# Patient Record
Sex: MALE | Race: WHITE | NOT HISPANIC OR LATINO | URBAN - METROPOLITAN AREA
[De-identification: names, ages, dates, MRNs, and addresses within clinical notes are randomized per-mention and may not be internally consistent; named-entity substitution may affect disease eponyms.]

---

## 2017-05-30 ENCOUNTER — OUTPATIENT (OUTPATIENT)
Dept: OUTPATIENT SERVICES | Facility: HOSPITAL | Age: 82
LOS: 1 days | Discharge: HOME | End: 2017-05-30

## 2017-06-28 DIAGNOSIS — N18.4 CHRONIC KIDNEY DISEASE, STAGE 4 (SEVERE): ICD-10-CM

## 2017-06-28 DIAGNOSIS — E21.3 HYPERPARATHYROIDISM, UNSPECIFIED: ICD-10-CM

## 2017-06-28 DIAGNOSIS — I10 ESSENTIAL (PRIMARY) HYPERTENSION: ICD-10-CM

## 2017-10-26 ENCOUNTER — INPATIENT (INPATIENT)
Facility: HOSPITAL | Age: 82
LOS: 13 days | Discharge: NURSING, REHAB OR LONG-TERM CARE | End: 2017-11-09
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
## 2017-11-02 PROBLEM — Z00.00 ENCOUNTER FOR PREVENTIVE HEALTH EXAMINATION: Status: ACTIVE | Noted: 2017-11-02

## 2017-11-13 DIAGNOSIS — G92 TOXIC ENCEPHALOPATHY: ICD-10-CM

## 2017-11-13 DIAGNOSIS — R26.89 OTHER ABNORMALITIES OF GAIT AND MOBILITY: ICD-10-CM

## 2017-11-13 DIAGNOSIS — Z04.3 ENCOUNTER FOR EXAMINATION AND OBSERVATION FOLLOWING OTHER ACCIDENT: ICD-10-CM

## 2017-11-13 DIAGNOSIS — J15.6 PNEUMONIA DUE TO OTHER GRAM-NEGATIVE BACTERIA: ICD-10-CM

## 2017-11-13 DIAGNOSIS — I89.0 LYMPHEDEMA, NOT ELSEWHERE CLASSIFIED: ICD-10-CM

## 2017-11-13 DIAGNOSIS — E87.0 HYPEROSMOLALITY AND HYPERNATREMIA: ICD-10-CM

## 2017-11-13 DIAGNOSIS — F03.90 UNSPECIFIED DEMENTIA WITHOUT BEHAVIORAL DISTURBANCE: ICD-10-CM

## 2017-11-13 DIAGNOSIS — R68.0 HYPOTHERMIA, NOT ASSOCIATED WITH LOW ENVIRONMENTAL TEMPERATURE: ICD-10-CM

## 2017-11-13 DIAGNOSIS — N17.0 ACUTE KIDNEY FAILURE WITH TUBULAR NECROSIS: ICD-10-CM

## 2017-11-13 DIAGNOSIS — I12.9 HYPERTENSIVE CHRONIC KIDNEY DISEASE WITH STAGE 1 THROUGH STAGE 4 CHRONIC KIDNEY DISEASE, OR UNSPECIFIED CHRONIC KIDNEY DISEASE: ICD-10-CM

## 2017-11-13 DIAGNOSIS — R13.10 DYSPHAGIA, UNSPECIFIED: ICD-10-CM

## 2017-11-13 DIAGNOSIS — R31.0 GROSS HEMATURIA: ICD-10-CM

## 2017-11-13 DIAGNOSIS — L89.810 PRESSURE ULCER OF HEAD, UNSTAGEABLE: ICD-10-CM

## 2017-11-13 DIAGNOSIS — E87.2 ACIDOSIS: ICD-10-CM

## 2017-11-13 DIAGNOSIS — N25.81 SECONDARY HYPERPARATHYROIDISM OF RENAL ORIGIN: ICD-10-CM

## 2017-11-13 DIAGNOSIS — N40.1 BENIGN PROSTATIC HYPERPLASIA WITH LOWER URINARY TRACT SYMPTOMS: ICD-10-CM

## 2017-11-13 DIAGNOSIS — I48.91 UNSPECIFIED ATRIAL FIBRILLATION: ICD-10-CM

## 2017-11-13 DIAGNOSIS — M62.82 RHABDOMYOLYSIS: ICD-10-CM

## 2017-11-13 DIAGNOSIS — Z51.5 ENCOUNTER FOR PALLIATIVE CARE: ICD-10-CM

## 2017-11-13 DIAGNOSIS — E43 UNSPECIFIED SEVERE PROTEIN-CALORIE MALNUTRITION: ICD-10-CM

## 2017-11-13 DIAGNOSIS — R04.0 EPISTAXIS: ICD-10-CM

## 2017-11-13 DIAGNOSIS — E86.9 VOLUME DEPLETION, UNSPECIFIED: ICD-10-CM

## 2017-11-13 DIAGNOSIS — Z79.82 LONG TERM (CURRENT) USE OF ASPIRIN: ICD-10-CM

## 2017-11-13 DIAGNOSIS — J69.0 PNEUMONITIS DUE TO INHALATION OF FOOD AND VOMIT: ICD-10-CM

## 2017-11-13 DIAGNOSIS — R19.5 OTHER FECAL ABNORMALITIES: ICD-10-CM

## 2017-11-13 DIAGNOSIS — N18.4 CHRONIC KIDNEY DISEASE, STAGE 4 (SEVERE): ICD-10-CM

## 2017-11-13 DIAGNOSIS — I87.2 VENOUS INSUFFICIENCY (CHRONIC) (PERIPHERAL): ICD-10-CM

## 2017-11-13 DIAGNOSIS — L89.890 PRESSURE ULCER OF OTHER SITE, UNSTAGEABLE: ICD-10-CM

## 2017-11-13 DIAGNOSIS — R33.8 OTHER RETENTION OF URINE: ICD-10-CM

## 2017-11-21 ENCOUNTER — OUTPATIENT (OUTPATIENT)
Dept: OUTPATIENT SERVICES | Facility: HOSPITAL | Age: 82
LOS: 1 days | Discharge: HOME | End: 2017-11-21

## 2017-11-21 DIAGNOSIS — G93.40 ENCEPHALOPATHY, UNSPECIFIED: ICD-10-CM

## 2017-11-21 DIAGNOSIS — N17.8 OTHER ACUTE KIDNEY FAILURE: ICD-10-CM
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
## 2017-12-20 ENCOUNTER — OUTPATIENT (OUTPATIENT)
Dept: OUTPATIENT SERVICES | Facility: HOSPITAL | Age: 82
LOS: 1 days | Discharge: HOME | End: 2017-12-20
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
## 2017-12-22 DIAGNOSIS — N39.0 URINARY TRACT INFECTION, SITE NOT SPECIFIED: ICD-10-CM

## 2018-02-17 ENCOUNTER — OUTPATIENT (OUTPATIENT)
Dept: OUTPATIENT SERVICES | Facility: HOSPITAL | Age: 83
LOS: 1 days | Discharge: HOME | End: 2018-02-17

## 2018-02-17 DIAGNOSIS — E78.5 HYPERLIPIDEMIA, UNSPECIFIED: ICD-10-CM

## 2018-02-17 DIAGNOSIS — N17.8 OTHER ACUTE KIDNEY FAILURE: ICD-10-CM

## 2018-04-23 ENCOUNTER — OUTPATIENT (OUTPATIENT)
Dept: OUTPATIENT SERVICES | Facility: HOSPITAL | Age: 83
LOS: 1 days | Discharge: HOME | End: 2018-04-23

## 2018-04-23 DIAGNOSIS — R50.9 FEVER, UNSPECIFIED: ICD-10-CM

## 2018-04-24 ENCOUNTER — OUTPATIENT (OUTPATIENT)
Dept: OUTPATIENT SERVICES | Facility: HOSPITAL | Age: 83
LOS: 1 days | Discharge: HOME | End: 2018-04-24

## 2018-04-24 DIAGNOSIS — Z02.9 ENCOUNTER FOR ADMINISTRATIVE EXAMINATIONS, UNSPECIFIED: ICD-10-CM

## 2018-04-24 DIAGNOSIS — I10 ESSENTIAL (PRIMARY) HYPERTENSION: ICD-10-CM

## 2018-04-24 DIAGNOSIS — R78.81 BACTEREMIA: ICD-10-CM

## 2018-04-25 ENCOUNTER — OUTPATIENT (OUTPATIENT)
Dept: OUTPATIENT SERVICES | Facility: HOSPITAL | Age: 83
LOS: 1 days | Discharge: HOME | End: 2018-04-25

## 2018-04-25 DIAGNOSIS — D72.829 ELEVATED WHITE BLOOD CELL COUNT, UNSPECIFIED: ICD-10-CM

## 2018-04-26 ENCOUNTER — OUTPATIENT (OUTPATIENT)
Dept: OUTPATIENT SERVICES | Facility: HOSPITAL | Age: 83
LOS: 1 days | Discharge: HOME | End: 2018-04-26

## 2018-04-26 DIAGNOSIS — N17.8 OTHER ACUTE KIDNEY FAILURE: ICD-10-CM

## 2018-04-26 DIAGNOSIS — R79.89 OTHER SPECIFIED ABNORMAL FINDINGS OF BLOOD CHEMISTRY: ICD-10-CM

## 2018-05-07 ENCOUNTER — OUTPATIENT (OUTPATIENT)
Dept: OUTPATIENT SERVICES | Facility: HOSPITAL | Age: 83
LOS: 1 days | Discharge: HOME | End: 2018-05-07

## 2018-05-07 DIAGNOSIS — E87.6 HYPOKALEMIA: ICD-10-CM

## 2018-05-10 ENCOUNTER — OUTPATIENT (OUTPATIENT)
Dept: OUTPATIENT SERVICES | Facility: HOSPITAL | Age: 83
LOS: 1 days | Discharge: HOME | End: 2018-05-10

## 2018-05-10 DIAGNOSIS — E55.9 VITAMIN D DEFICIENCY, UNSPECIFIED: ICD-10-CM

## 2018-05-19 ENCOUNTER — OUTPATIENT (OUTPATIENT)
Dept: OUTPATIENT SERVICES | Facility: HOSPITAL | Age: 83
LOS: 1 days | Discharge: HOME | End: 2018-05-19

## 2018-05-19 DIAGNOSIS — Z02.9 ENCOUNTER FOR ADMINISTRATIVE EXAMINATIONS, UNSPECIFIED: ICD-10-CM

## 2018-05-21 ENCOUNTER — OUTPATIENT (OUTPATIENT)
Dept: OUTPATIENT SERVICES | Facility: HOSPITAL | Age: 83
LOS: 1 days | Discharge: HOME | End: 2018-05-21

## 2018-05-21 DIAGNOSIS — R33.9 RETENTION OF URINE, UNSPECIFIED: ICD-10-CM

## 2018-05-29 ENCOUNTER — OUTPATIENT (OUTPATIENT)
Dept: OUTPATIENT SERVICES | Facility: HOSPITAL | Age: 83
LOS: 1 days | Discharge: HOME | End: 2018-05-29

## 2018-05-29 DIAGNOSIS — E87.6 HYPOKALEMIA: ICD-10-CM

## 2018-06-25 ENCOUNTER — OUTPATIENT (OUTPATIENT)
Dept: OUTPATIENT SERVICES | Facility: HOSPITAL | Age: 83
LOS: 1 days | Discharge: HOME | End: 2018-06-25

## 2018-06-25 DIAGNOSIS — N39.0 URINARY TRACT INFECTION, SITE NOT SPECIFIED: ICD-10-CM

## 2018-06-26 ENCOUNTER — OUTPATIENT (OUTPATIENT)
Dept: OUTPATIENT SERVICES | Facility: HOSPITAL | Age: 83
LOS: 1 days | Discharge: HOME | End: 2018-06-26

## 2018-06-26 DIAGNOSIS — A41.9 SEPSIS, UNSPECIFIED ORGANISM: ICD-10-CM

## 2018-06-27 DIAGNOSIS — R79.9 ABNORMAL FINDING OF BLOOD CHEMISTRY, UNSPECIFIED: ICD-10-CM

## 2018-07-12 ENCOUNTER — INPATIENT (INPATIENT)
Facility: HOSPITAL | Age: 83
LOS: 3 days | Discharge: NURSING, REHAB OR LONG-TERM CARE | End: 2018-07-16
Attending: HOSPITALIST | Admitting: HOSPITALIST

## 2018-07-12 ENCOUNTER — OUTPATIENT (OUTPATIENT)
Dept: OUTPATIENT SERVICES | Facility: HOSPITAL | Age: 83
LOS: 1 days | Discharge: HOME | End: 2018-07-12

## 2018-07-12 VITALS
HEART RATE: 114 BPM | OXYGEN SATURATION: 93 % | DIASTOLIC BLOOD PRESSURE: 46 MMHG | SYSTOLIC BLOOD PRESSURE: 85 MMHG | RESPIRATION RATE: 22 BRPM

## 2018-07-12 DIAGNOSIS — A41.9 SEPSIS, UNSPECIFIED ORGANISM: ICD-10-CM

## 2018-07-12 DIAGNOSIS — T83.511A INFECTION AND INFLAMMATORY REACTION DUE TO INDWELLING URETHRAL CATHETER, INITIAL ENCOUNTER: ICD-10-CM

## 2018-07-12 LAB
ALBUMIN SERPL ELPH-MCNC: 2.3 G/DL — LOW (ref 3.5–5.2)
ALP SERPL-CCNC: 257 U/L — HIGH (ref 30–115)
ALT FLD-CCNC: 16 U/L — SIGNIFICANT CHANGE UP (ref 0–41)
ANION GAP SERPL CALC-SCNC: 20 MMOL/L — HIGH (ref 7–14)
ANISOCYTOSIS BLD QL: SLIGHT — SIGNIFICANT CHANGE UP
APTT BLD: 29.6 SEC — SIGNIFICANT CHANGE UP (ref 27–39.2)
AST SERPL-CCNC: 25 U/L — SIGNIFICANT CHANGE UP (ref 0–41)
BASE EXCESS BLDV CALC-SCNC: -0.1 MMOL/L — SIGNIFICANT CHANGE UP (ref -2–2)
BASOPHILS # BLD AUTO: 0 K/UL — SIGNIFICANT CHANGE UP (ref 0–0.2)
BASOPHILS NFR BLD AUTO: 0 % — SIGNIFICANT CHANGE UP (ref 0–1)
BILIRUB SERPL-MCNC: 0.6 MG/DL — SIGNIFICANT CHANGE UP (ref 0.2–1.2)
BLD GP AB SCN SERPL QL: SIGNIFICANT CHANGE UP
BUN SERPL-MCNC: 112 MG/DL — CRITICAL HIGH (ref 10–20)
BURR CELLS BLD QL SMEAR: PRESENT — SIGNIFICANT CHANGE UP
BURR CELLS BLD QL SMEAR: SLIGHT — SIGNIFICANT CHANGE UP
CA-I SERPL-SCNC: 1.22 MMOL/L — SIGNIFICANT CHANGE UP (ref 1.12–1.3)
CALCIUM SERPL-MCNC: 9.4 MG/DL — SIGNIFICANT CHANGE UP (ref 8.5–10.1)
CHLORIDE SERPL-SCNC: 94 MMOL/L — LOW (ref 98–110)
CK MB CFR SERPL CALC: 1.2 NG/ML — SIGNIFICANT CHANGE UP (ref 0.6–6.3)
CK SERPL-CCNC: 136 U/L — SIGNIFICANT CHANGE UP (ref 0–225)
CO2 SERPL-SCNC: 22 MMOL/L — SIGNIFICANT CHANGE UP (ref 17–32)
CREAT SERPL-MCNC: 5.1 MG/DL — CRITICAL HIGH (ref 0.7–1.5)
EOSINOPHIL # BLD AUTO: 0 K/UL — SIGNIFICANT CHANGE UP (ref 0–0.7)
EOSINOPHIL NFR BLD AUTO: 0 % — SIGNIFICANT CHANGE UP (ref 0–8)
GAS PNL BLDV: 135 MMOL/L — LOW (ref 136–145)
GAS PNL BLDV: SIGNIFICANT CHANGE UP
GIANT PLATELETS BLD QL SMEAR: PRESENT — SIGNIFICANT CHANGE UP
GLUCOSE SERPL-MCNC: 90 MG/DL — SIGNIFICANT CHANGE UP (ref 70–99)
HCO3 BLDV-SCNC: 25 MMOL/L — SIGNIFICANT CHANGE UP (ref 22–29)
HCT VFR BLD CALC: 31.3 % — LOW (ref 42–52)
HCT VFR BLDA CALC: 43.2 % — SIGNIFICANT CHANGE UP (ref 34–44)
HGB BLD CALC-MCNC: 14.1 G/DL — SIGNIFICANT CHANGE UP (ref 14–18)
HGB BLD-MCNC: 10.3 G/DL — LOW (ref 14–18)
INR BLD: 1.14 RATIO — SIGNIFICANT CHANGE UP (ref 0.65–1.3)
LACTATE BLDV-MCNC: 4 MMOL/L — HIGH (ref 0.5–1.6)
LYMPHOCYTES # BLD AUTO: 0.05 K/UL — LOW (ref 1.2–3.4)
LYMPHOCYTES # BLD AUTO: 1 % — LOW (ref 20.5–51.1)
MAGNESIUM SERPL-MCNC: 1.8 MG/DL — SIGNIFICANT CHANGE UP (ref 1.8–2.4)
MANUAL SMEAR VERIFICATION: SIGNIFICANT CHANGE UP
MCHC RBC-ENTMCNC: 29.9 PG — SIGNIFICANT CHANGE UP (ref 27–31)
MCHC RBC-ENTMCNC: 32.9 G/DL — SIGNIFICANT CHANGE UP (ref 32–37)
MCV RBC AUTO: 91 FL — SIGNIFICANT CHANGE UP (ref 80–94)
MONOCYTES # BLD AUTO: 0 K/UL — LOW (ref 0.1–0.6)
MONOCYTES NFR BLD AUTO: 0 % — LOW (ref 1.7–9.3)
NEUTROPHILS # BLD AUTO: 4.91 K/UL — SIGNIFICANT CHANGE UP (ref 1.4–6.5)
NEUTROPHILS NFR BLD AUTO: 99 % — HIGH (ref 42.2–75.2)
NRBC # BLD: 0 /100 WBCS — SIGNIFICANT CHANGE UP (ref 0–0)
PCO2 BLDV: 43 MMHG — SIGNIFICANT CHANGE UP (ref 41–51)
PH BLDV: 7.38 — SIGNIFICANT CHANGE UP (ref 7.26–7.43)
PLAT MORPH BLD: NORMAL — SIGNIFICANT CHANGE UP
PLATELET # BLD AUTO: 137 K/UL — SIGNIFICANT CHANGE UP (ref 130–400)
PO2 BLDV: 35 MMHG — SIGNIFICANT CHANGE UP (ref 20–40)
POIKILOCYTOSIS BLD QL AUTO: SIGNIFICANT CHANGE UP
POTASSIUM BLDV-SCNC: 4 MMOL/L — SIGNIFICANT CHANGE UP (ref 3.3–5.6)
POTASSIUM SERPL-MCNC: 4.1 MMOL/L — SIGNIFICANT CHANGE UP (ref 3.5–5)
POTASSIUM SERPL-SCNC: 4.1 MMOL/L — SIGNIFICANT CHANGE UP (ref 3.5–5)
PROT SERPL-MCNC: 5.1 G/DL — LOW (ref 6–8)
PROTHROM AB SERPL-ACNC: 12.3 SEC — SIGNIFICANT CHANGE UP (ref 9.95–12.87)
RBC # BLD: 3.44 M/UL — LOW (ref 4.7–6.1)
RBC # FLD: 15.8 % — HIGH (ref 11.5–14.5)
RBC BLD AUTO: (no result)
SAO2 % BLDV: 60 % — SIGNIFICANT CHANGE UP
SMUDGE CELLS # BLD: PRESENT — SIGNIFICANT CHANGE UP
SODIUM SERPL-SCNC: 136 MMOL/L — SIGNIFICANT CHANGE UP (ref 135–146)
TROPONIN T SERPL-MCNC: 0.14 NG/ML — CRITICAL HIGH
TYPE + AB SCN PNL BLD: SIGNIFICANT CHANGE UP
WBC # BLD: 4.96 K/UL — SIGNIFICANT CHANGE UP (ref 4.8–10.8)
WBC # FLD AUTO: 4.96 K/UL — SIGNIFICANT CHANGE UP (ref 4.8–10.8)

## 2018-07-12 RX ORDER — ASPIRIN/CALCIUM CARB/MAGNESIUM 324 MG
81 TABLET ORAL DAILY
Qty: 0 | Refills: 0 | Status: DISCONTINUED | OUTPATIENT
Start: 2018-07-12 | End: 2018-07-13

## 2018-07-12 RX ORDER — METOPROLOL TARTRATE 50 MG
50 TABLET ORAL
Qty: 0 | Refills: 0 | Status: DISCONTINUED | OUTPATIENT
Start: 2018-07-12 | End: 2018-07-13

## 2018-07-12 RX ORDER — SODIUM CHLORIDE 9 MG/ML
500 INJECTION INTRAMUSCULAR; INTRAVENOUS; SUBCUTANEOUS ONCE
Qty: 0 | Refills: 0 | Status: COMPLETED | OUTPATIENT
Start: 2018-07-12 | End: 2018-07-12

## 2018-07-12 RX ORDER — ACETAMINOPHEN 500 MG
650 TABLET ORAL EVERY 6 HOURS
Qty: 0 | Refills: 0 | Status: DISCONTINUED | OUTPATIENT
Start: 2018-07-12 | End: 2018-07-13

## 2018-07-12 RX ORDER — DOCUSATE SODIUM 100 MG
0 CAPSULE ORAL
Qty: 0 | Refills: 0 | COMMUNITY

## 2018-07-12 RX ORDER — ATORVASTATIN CALCIUM 80 MG/1
40 TABLET, FILM COATED ORAL AT BEDTIME
Qty: 0 | Refills: 0 | Status: DISCONTINUED | OUTPATIENT
Start: 2018-07-12 | End: 2018-07-13

## 2018-07-12 RX ORDER — MEROPENEM 1 G/30ML
500 INJECTION INTRAVENOUS ONCE
Qty: 0 | Refills: 0 | Status: COMPLETED | OUTPATIENT
Start: 2018-07-12 | End: 2018-07-13

## 2018-07-12 RX ORDER — HEPARIN SODIUM 5000 [USP'U]/ML
5000 INJECTION INTRAVENOUS; SUBCUTANEOUS EVERY 8 HOURS
Qty: 0 | Refills: 0 | Status: DISCONTINUED | OUTPATIENT
Start: 2018-07-12 | End: 2018-07-13

## 2018-07-12 RX ORDER — SENNA PLUS 8.6 MG/1
1 TABLET ORAL
Qty: 0 | Refills: 0 | COMMUNITY

## 2018-07-12 RX ORDER — FOLIC ACID 0.8 MG
1 TABLET ORAL DAILY
Qty: 0 | Refills: 0 | Status: DISCONTINUED | OUTPATIENT
Start: 2018-07-12 | End: 2018-07-13

## 2018-07-12 RX ORDER — FOLIC ACID 0.8 MG
1 TABLET ORAL
Qty: 0 | Refills: 0 | COMMUNITY

## 2018-07-12 RX ORDER — SENNA PLUS 8.6 MG/1
2 TABLET ORAL AT BEDTIME
Qty: 0 | Refills: 0 | Status: DISCONTINUED | OUTPATIENT
Start: 2018-07-12 | End: 2018-07-13

## 2018-07-12 RX ORDER — MEROPENEM 1 G/30ML
INJECTION INTRAVENOUS
Qty: 0 | Refills: 0 | Status: DISCONTINUED | OUTPATIENT
Start: 2018-07-13 | End: 2018-07-13

## 2018-07-12 RX ORDER — VANCOMYCIN HCL 1 G
1000 VIAL (EA) INTRAVENOUS ONCE
Qty: 0 | Refills: 0 | Status: COMPLETED | OUTPATIENT
Start: 2018-07-12 | End: 2018-07-12

## 2018-07-12 RX ORDER — SODIUM CHLORIDE 9 MG/ML
1000 INJECTION INTRAMUSCULAR; INTRAVENOUS; SUBCUTANEOUS
Qty: 0 | Refills: 0 | Status: DISCONTINUED | OUTPATIENT
Start: 2018-07-12 | End: 2018-07-13

## 2018-07-12 RX ORDER — SODIUM CHLORIDE 9 MG/ML
1000 INJECTION, SOLUTION INTRAVENOUS ONCE
Qty: 0 | Refills: 0 | Status: COMPLETED | OUTPATIENT
Start: 2018-07-12 | End: 2018-07-12

## 2018-07-12 RX ORDER — SODIUM CHLORIDE 9 MG/ML
1 INJECTION INTRAMUSCULAR; INTRAVENOUS; SUBCUTANEOUS ONCE
Qty: 0 | Refills: 0 | Status: COMPLETED | OUTPATIENT
Start: 2018-07-12 | End: 2018-07-12

## 2018-07-12 RX ORDER — MEROPENEM 1 G/30ML
500 INJECTION INTRAVENOUS EVERY 12 HOURS
Qty: 0 | Refills: 0 | Status: DISCONTINUED | OUTPATIENT
Start: 2018-07-13 | End: 2018-07-13

## 2018-07-12 RX ORDER — ATORVASTATIN CALCIUM 80 MG/1
1 TABLET, FILM COATED ORAL
Qty: 0 | Refills: 0 | COMMUNITY

## 2018-07-12 RX ORDER — CALCITRIOL 0.5 UG/1
0.5 CAPSULE ORAL DAILY
Qty: 0 | Refills: 0 | Status: DISCONTINUED | OUTPATIENT
Start: 2018-07-12 | End: 2018-07-13

## 2018-07-12 RX ORDER — VORTIOXETINE 5 MG/1
1 TABLET, FILM COATED ORAL
Qty: 0 | Refills: 0 | COMMUNITY

## 2018-07-12 RX ORDER — PANTOPRAZOLE SODIUM 20 MG/1
40 TABLET, DELAYED RELEASE ORAL
Qty: 0 | Refills: 0 | Status: DISCONTINUED | OUTPATIENT
Start: 2018-07-12 | End: 2018-07-13

## 2018-07-12 RX ORDER — LACTOBACILLUS ACIDOPHILUS 100MM CELL
2 CAPSULE ORAL
Qty: 0 | Refills: 0 | COMMUNITY

## 2018-07-12 RX ORDER — CEFEPIME 1 G/1
1000 INJECTION, POWDER, FOR SOLUTION INTRAMUSCULAR; INTRAVENOUS EVERY 12 HOURS
Qty: 0 | Refills: 0 | Status: DISCONTINUED | OUTPATIENT
Start: 2018-07-12 | End: 2018-07-12

## 2018-07-12 RX ORDER — CALCITRIOL 0.5 UG/1
1 CAPSULE ORAL
Qty: 0 | Refills: 0 | COMMUNITY

## 2018-07-12 RX ORDER — DOCUSATE SODIUM 100 MG
100 CAPSULE ORAL
Qty: 0 | Refills: 0 | Status: DISCONTINUED | OUTPATIENT
Start: 2018-07-12 | End: 2018-07-13

## 2018-07-12 RX ORDER — ASPIRIN/CALCIUM CARB/MAGNESIUM 324 MG
1 TABLET ORAL
Qty: 0 | Refills: 0 | COMMUNITY

## 2018-07-12 RX ADMIN — Medication 250 MILLIGRAM(S): at 17:37

## 2018-07-12 RX ADMIN — SODIUM CHLORIDE 2000 MILLILITER(S): 9 INJECTION, SOLUTION INTRAVENOUS at 18:22

## 2018-07-12 RX ADMIN — CEFEPIME 100 MILLIGRAM(S): 1 INJECTION, POWDER, FOR SOLUTION INTRAMUSCULAR; INTRAVENOUS at 17:16

## 2018-07-12 RX ADMIN — SODIUM CHLORIDE 75 MILLILITER(S): 9 INJECTION INTRAMUSCULAR; INTRAVENOUS; SUBCUTANEOUS at 22:25

## 2018-07-12 RX ADMIN — SODIUM CHLORIDE 1000 MILLILITER(S): 9 INJECTION INTRAMUSCULAR; INTRAVENOUS; SUBCUTANEOUS at 22:25

## 2018-07-12 NOTE — H&P ADULT - ASSESSMENT
89 yo M with PMHx of CAD s/p stent, GERD, HTN, DLD, Dementia presents from [facility] with hypotension and fever.    #) Septic shock, unclear etiology  - CXR is negative  - f/u UA, UCx  - f/u BCx   - Fever: Tylenol 650 mg Q6H PRN    #) FARZANA on CKD4  - Baseline Cr from 05/2018 was 1.7-1.8  - NS @ 75 cc/hr    #) Troponinemia likely 2/2 CKD as CKMB/CK is not appropriately elevated  - f/u CE @ 11:30 pm   - f/u stat EKG    #) Azotemia possibly 2/2 increased Cellular turnover [CLL?]  - Marked poikilocytosis, scott cells, smudge cells, giant platelet  - [facility] does not want aggressive measures; however, therefore, will not order Heme Onc consult    #) HTN  - c/w Lopressor 50 mg BID    #) DLD  - Lipitor 40 mg QHS    #) Normocytic Anemia  - Hb at baseline (~ 10)  - Marked poikilocytosis, scott cells, smudge cells, giant platelet    #) Vit D Deficiency  - Calcitriol 0.5 ug QD    #) Constipation  - Senna/Colace    #) Activity: Bedrest  #) Diet: Carb consistent, low sodium    #) GI ppx: PO Protonix QD  #) DVT ppx: HSQ    #) DNR/DNI as per WISAM 87 yo M with PMHx of CAD s/p stent, GERD, HTN, DLD, Dementia presents from [facility] with hypotension and fever.    #) Septic shock, unclear etiology, possibly UTI due to Urinary Catheter  - CXR is negative  - f/u UA, UCx  - f/u BCx   - Fever: Tylenol 650 mg Q6H PRN  - Change Childress today on 07/12  - d/c Cefepime, start on Meropenem 500 BID    #) FARZANA on CKD4  - Baseline Cr from 05/2018 was 1.7-1.8  - NS @ 75 cc/hr    #) Troponinemia likely 2/2 CKD as CKMB/CK is not appropriately elevated  - f/u CE @ 11:30 pm   - f/u stat EKG    #) Azotemia possibly 2/2 increased Cellular turnover [CLL?]  - Marked poikilocytosis, scott cells, smudge cells, giant platelet  - HCP does not want aggressive measures; however, therefore, will not order Heme Onc consult    #) HTN  - c/w Lopressor 50 mg BID    #) DLD  - Lipitor 40 mg QHS    #) Normocytic Anemia  - Hb at baseline (~ 10)  - Marked poikilocytosis, scott cells, smudge cells, giant platelet    #) Vit D Deficiency  - Calcitriol 0.5 ug QD    #) Constipation  - Senna/Colace    #) Activity: Bedrest  #) Diet: Carb consistent, low sodium    #) GI ppx: PO Protonix QD  #) DVT ppx: HSQ    #) DNR/DNI as per WISAM 87 yo M with PMHx of CAD s/p stent, GERD, HTN, DLD, Dementia presents from [facility] with hypotension and fever.    #) Septic shock, unclear etiology, possibly UTI due to Urinary Catheter  - CXR is negative  - f/u UA, UCx  - f/u BCx   - Fever: Tylenol 650 mg Q6H PRN  - Change Childress today on 07/12  - d/c Cefepime, start on Meropenem 500 BID    #) FARZANA on CKD4  - Baseline Cr from 05/2018 was 1.7-1.8  - NS @ 75 cc/hr    #) Troponinemia likely 2/2 CKD as CKMB/CK is not appropriately elevated  - f/u CE @ 11:30 pm   - Stat EKG shows new-onset AFib, rate-controlled    #) Azotemia possibly 2/2 increased Cellular turnover [CLL?]  - Marked poikilocytosis, scott cells, smudge cells, giant platelet  - HCP does not want aggressive measures therefore, will not order Heme Onc consult    #) HTN  - c/w Lopressor 50 mg BID    #) DLD  - Lipitor 40 mg QHS    #) Normocytic Anemia  - Hb at baseline (~ 10)  - Marked poikilocytosis, scott cells, smudge cells, giant platelet    #) Vit D Deficiency  - Calcitriol 0.5 ug QD    #) Constipation  - Senna/Colace    #) Activity: Bedrest  #) Diet: NPO    #) GI ppx: PO Protonix QD  #) DVT ppx: HSQ    #) DNR/DNI as per WISAM 89 yo M with PMHx of CAD s/p stent, GERD, HTN, DLD, Dementia [facility]    #) Septic shock, unclear etiology, possibly UTI due to Urinary Catheter  - CXR is negative  - f/u UA, UCx  - f/u BCx   - Fever: Tylenol 650 mg Q6H PRN  - Change Childress today on 07/12  - d/c Cefepime, start on Meropenem 500 BID    #) FARZANA on CKD4  - Baseline Cr from 05/2018 was 1.7-1.8  - NS @ 75 cc/hr  - No need for Retroperitoneal US as cousin does not want aggressive measures    #) Troponinemia likely 2/2 CKD as CKMB/CK is not appropriately elevated  - f/u CE @ 11:30 pm   - Stat EKG shows new-onset AFib, rate-controlled    #) Azotemia possibly 2/2 increased Cellular turnover [CLL?]  - Marked poikilocytosis, scott cells, smudge cells, giant platelet  - HCP does not want aggressive measures therefore, will not order Heme Onc consult    #) HTN  - c/w Lopressor 50 mg BID    #) DLD  - Lipitor 40 mg QHS    #) Normocytic Anemia  - Hb at baseline (~ 10)  - Marked poikilocytosis, scott cells, smudge cells, giant platelet    #) Vit D Deficiency  - Calcitriol 0.5 ug QD    #) Constipation  - Senna/Colace    #) Activity: Bedrest  #) Diet: NPO    #) GI ppx: PO Protonix QD  #) DVT ppx: HSQ    #) DNR/DNI as per WISAM 89 yo M with PMHx of CAD s/p stent, GERD, HTN, DLD, Dementia presents from [facility] with hypotension and fever.    #) Septic shock, unclear etiology, possibly UTI due to Urinary Catheter  - CXR is negative  - f/u UA, UCx  - f/u BCx   - Fever: Tylenol 650 mg Q6H PRN  - Change Childress today on 07/12  - d/c Cefepime, start on Meropenem 500 BID    #) FARZANA on CKD4  - Baseline Cr from 05/2018 was 1.7-1.8  - NS @ 75 cc/hr  - No need for Retroperitoneal US as cousin does not want aggressive measures    #) Troponinemia likely 2/2 CKD as CKMB/CK is not appropriately elevated  - f/u CE @ 11:30 pm   - Stat EKG shows new-onset AFib, rate-controlled. CHADVASc 3; however benefit of anticoagulation < risk given mortality. HCP also did not want anticoagulation.    #) Azotemia possibly 2/2 increased Cellular turnover [CLL?]  - Marked poikilocytosis, scott cells, smudge cells, giant platelet  - HCP does not want aggressive measures therefore, will not order Heme Onc consult    #) HTN  - c/w Lopressor 50 mg BID    #) DLD  - Lipitor 40 mg QHS    #) Normocytic Anemia  - Hb at baseline (~ 10)  - Marked poikilocytosis, scott cells, smudge cells, giant platelet    #) Vit D Deficiency  - Calcitriol 0.5 ug QD    #) Constipation  - Senna/Colace    #) Activity: Bedrest  #) Diet: NPO    #) GI ppx: PO Protonix QD  #) DVT ppx: HSQ    #) DNR/DNI as per WISAM

## 2018-07-12 NOTE — ED PROVIDER NOTE - PROGRESS NOTE DETAILS
Dr. Cummings discussed case with patient's power of  Norberto Tobias - states patient is comfort care only. No intubation, central line, pressors, advanced imaging. He is ok with blood work, IVF, abx and pain control. Dr. Cummings discussed case with patient's power of  Norberto Tobias - states patient is comfort care only. No intubation, central line, pressors, advanced imaging, chest compressions, or any other "extraordinary measures". He is ok with blood work, IVF, abx and pain control.

## 2018-07-12 NOTE — H&P ADULT - NSHPPHYSICALEXAM_GEN_ALL_CORE
PHYSICAL EXAM:  GEN: No acute distress  HEENT: NCAT  LUNGS: Clear to auscultation bilaterally   HEART: S1/S2 present. RRR.   ABD: Soft, non-tender, non-distended. Bowel sounds present  EXT:  NEURO: AAOX3 PHYSICAL EXAM:  GEN: Pale, anemic, weak appearing elderly male lying on bed  HEENT: NCAT  LUNGS: Clear to auscultation bilaterally   HEART: S1/S2 present. Tachycardic  ABD: Soft, tender to suprapubic area, non-distended. Bowel sounds present  EXT: No pitting edema  NEURO: Alert, and able to respond to questions with nod/yes/no. However, not able to make full/complex conversation.

## 2018-07-12 NOTE — ED PROVIDER NOTE - ATTENDING CONTRIBUTION TO CARE
87 y/o male with PMH CAD with stents, GERD, HTN, HLD, dementia who was sent to ED from [place] for hypotension and fever. 87 y/o male with PMH CAD with stents, GERD, HTN, HLD, dementia who was sent to ED from [place] for hypotension and fever. Patient is poor historian, unable to provide further history.    Vital Signs: I have reviewed the initial vital signs.  Constitutional: NAD, well-nourished, appears stated age, mild respiratory distress.  HEENT: Airway patent, moist MM, no erythema/swelling/deformity of oral structures. EOMI, PERRLA.  CV: regular rate, regular rhythm, well-perfused extremities, 2+ b/l DP and radial pulses equal.  Lungs: BCTA, no increased WOB.  ABD: NTND, no guarding or rebound, no pulsatile mass, no hernias.   MSK: Neck supple, nontender, nl ROM, no stepoff. Chest nontender. Back nontender in TLS spine or to b/l bony structures or flanks. Ext nontender, nl rom, no deformity.   INTEG: Skin warm, dry, no rash.  NEURO: A&Ox3, CN II-XII intact, normal strength 5/5 all 4 ext, nl sensation throughout, normal speech and coordination.  PSYCH: Calm, cooperative, normal affect and interaction.    Patient DNR/DNI based on POLST form in chart. WIll contact POA to determine what we are allowed to do for him medically. In the meantime, will give IVF, abx for suspected sepsis.

## 2018-07-12 NOTE — ED PROVIDER NOTE - OBJECTIVE STATEMENT
89 y/o male with PMH CAD with stents, GERD, HTN, HLD, dementia who was sent to ED from [place] for hypotension and fever. 89 y/o male with PMH CAD with stents, GERD, HTN, HLD, dementia who was sent to ED from [place] for hypotension and fever. Pt unable to provide further history.

## 2018-07-12 NOTE — H&P ADULT - HISTORY OF PRESENT ILLNESS
87 yo M with PMHx of CAD s/p stent, GERD, HTN, DLD, Dementia presents from [facility] with hypotension and fever. Patient had BP 81/54 and T 102.4 F. In ED, received 1 L LR and Vancomycin x 1 and Cefepime. He became hemodynamically stable. Currently, septic source is unknown. UA is sent but not yet resulted. CXR is negative. As per HCP, patient is DNR/DNI and does not want any aggressive measures. 89 yo M with PMHx of CAD s/p stent, GERD, HTN, DLD, Dementia presents from [facility] with hypotension and fever. Patient had BP 81/54 and T 102.4 F. In ED, received 1 L LR and Vancomycin x 1 and Cefepime. He became hemodynamically stable. Currently, septic source is unknown. UA is sent but not yet resulted. CXR is negative. As per HCP (cousin, only living relative), patient is DNR/DNI and does not want any aggressive measures. HCP is currently in [place].     HCP only agreed to changing the Childress catheter and adjustments to the antibiotic regimen. He agreed also to continued blood work to determine if there are other sources of infection. He does not want any other aggressive measure.

## 2018-07-12 NOTE — ED ADULT NURSE NOTE - ASSOCIATED SYMPTOMS
pt presents to ed with possible rectal bleed, generalized weakness, and hypoxia. upon exam pt noted with blood draining from rectum. pt noted to be hypotensive and tachycardic. pt nonverbal and can not make needs known to staff at this time

## 2018-07-12 NOTE — H&P ADULT - NSHPLABSRESULTS_GEN_ALL_CORE
LABS:                        10.3   4.96  )-----------( 137      ( 12 Jul 2018 16:54 )             31.3     07-12    136  |  94<L>  |  112<HH>  ----------------------------<  90  4.1   |  22  |  5.1<HH>    Ca    9.4      12 Jul 2018 16:54  Mg     1.8     07-12    TPro  5.1<L>  /  Alb  2.3<L>  /  TBili  0.6  /  DBili  x   /  AST  25  /  ALT  16  /  AlkPhos  257<H>  07-12    PT/INR - ( 12 Jul 2018 16:53 )   PT: 12.30 sec;   INR: 1.14 ratio         PTT - ( 12 Jul 2018 16:53 )  PTT:29.6 sec      Troponin T, Serum: 0.14 ng/mL <HH> (07-12-18 @ 16:54)  Creatine Kinase, Serum: 136 U/L (07-12-18 @ 16:54)      CARDIAC MARKERS ( 12 Jul 2018 16:54 )  x     / 0.14 ng/mL / 136 U/L / x     / 1.2 ng/mL

## 2018-07-12 NOTE — ED ADULT NURSE NOTE - PMH
FARZANA (acute kidney injury)    Altered mental status, unspecified    Anemia    CAD (coronary artery disease)    Depressed    Dermatitis    GERD (gastroesophageal reflux disease)    HLD (hyperlipidemia)    HTN (hypertension)    Hypokalemia    Scabies    Sepsis    UTI (urinary tract infection)    Vitamin deficiency

## 2018-07-12 NOTE — H&P ADULT - ATTENDING COMMENTS
87 yo M with Past Medical History CAD status post stenting, GERD, HTN, DLD, Dementia, and depression transferred from [facility] for hypotension and fevers secondary to septic shock secondary to catheter associated urinary tract infection.    Septic shock secondary to catheter associated urinary tract infection: UA was grossly positive with large leukocytes.  Advanced directives were reviewed from Nursing Home.  His family requested no aggressive measures and a previous DO NOT HOSPITALIZE was signed.  Palliative Care was consulted who recommended discontinuation of life saving measures (e.g. IV antibiotics, IV fluids, etc.)>  Will attempt to make the patient comfortable rather than treat systemic infection.  Start Roxanol for dyspnea and Ativan for increased agitation.    Acute on chronic renal failure: likely secondary to sepsis/ongoing infection.  Status post IVF infusion.  No further radiologic imaging warranted at this time (dc renal/bladder US).    Hypertension: hold Lopressor due to hypotension    Hypercholesteremia: continue Lipitor 40mg PO qHS    GI/DVT prophylaxis    DNR/DNI

## 2018-07-12 NOTE — ED PROVIDER NOTE - PHYSICAL EXAMINATION
CONSTITUTIONAL: Well-developed; well-nourished; in no acute distress.   SKIN: warm, dry  HEAD: Normocephalic; atraumatic.  EYES: no conjunctival injection. PERRL.   ENT: No nasal discharge; airway clear.  NECK: Supple; non tender.  CARD: S1, S2 normal; no murmurs, gallops, or rubs. Regular rate and rhythm.   RESP: No wheezes, rales or rhonchi.  ABD: soft ntnd  EXT: Normal ROM.  No clubbing, cyanosis or edema.   LYMPH: No acute cervical adenopathy.  NEURO: Alert, oriented, grossly unremarkable  PSYCH: Cooperative, appropriate.

## 2018-07-13 DIAGNOSIS — Z51.5 ENCOUNTER FOR PALLIATIVE CARE: ICD-10-CM

## 2018-07-13 DIAGNOSIS — Z02.9 ENCOUNTER FOR ADMINISTRATIVE EXAMINATIONS, UNSPECIFIED: ICD-10-CM

## 2018-07-13 DIAGNOSIS — Z71.89 OTHER SPECIFIED COUNSELING: ICD-10-CM

## 2018-07-13 DIAGNOSIS — A41.9 SEPSIS, UNSPECIFIED ORGANISM: ICD-10-CM

## 2018-07-13 LAB
ANION GAP SERPL CALC-SCNC: 16 MMOL/L — HIGH (ref 7–14)
ANION GAP SERPL CALC-SCNC: 19 MMOL/L — HIGH (ref 7–14)
ANION GAP SERPL CALC-SCNC: 19 MMOL/L — HIGH (ref 7–14)
APPEARANCE UR: (no result)
BACTERIA # UR AUTO: (no result) /HPF
BASOPHILS # BLD AUTO: 0 K/UL — SIGNIFICANT CHANGE UP (ref 0–0.2)
BASOPHILS NFR BLD AUTO: 0 % — SIGNIFICANT CHANGE UP (ref 0–1)
BILIRUB UR-MCNC: (no result)
BUN SERPL-MCNC: 106 MG/DL — CRITICAL HIGH (ref 10–20)
BUN SERPL-MCNC: 107 MG/DL — CRITICAL HIGH (ref 10–20)
BUN SERPL-MCNC: 110 MG/DL — CRITICAL HIGH (ref 10–20)
CALCIUM SERPL-MCNC: 8.1 MG/DL — LOW (ref 8.5–10.1)
CALCIUM SERPL-MCNC: 8.2 MG/DL — LOW (ref 8.5–10.1)
CALCIUM SERPL-MCNC: 8.2 MG/DL — LOW (ref 8.5–10.1)
CHLORIDE SERPL-SCNC: 100 MMOL/L — SIGNIFICANT CHANGE UP (ref 98–110)
CHLORIDE SERPL-SCNC: 103 MMOL/L — SIGNIFICANT CHANGE UP (ref 98–110)
CHLORIDE SERPL-SCNC: 105 MMOL/L — SIGNIFICANT CHANGE UP (ref 98–110)
CK SERPL-CCNC: 208 U/L — SIGNIFICANT CHANGE UP (ref 0–225)
CO2 SERPL-SCNC: 19 MMOL/L — SIGNIFICANT CHANGE UP (ref 17–32)
CO2 SERPL-SCNC: 20 MMOL/L — SIGNIFICANT CHANGE UP (ref 17–32)
CO2 SERPL-SCNC: 21 MMOL/L — SIGNIFICANT CHANGE UP (ref 17–32)
COLOR SPEC: (no result)
CREAT SERPL-MCNC: 4.4 MG/DL — CRITICAL HIGH (ref 0.7–1.5)
CREAT SERPL-MCNC: 4.5 MG/DL — CRITICAL HIGH (ref 0.7–1.5)
CREAT SERPL-MCNC: 4.6 MG/DL — CRITICAL HIGH (ref 0.7–1.5)
DIFF PNL FLD: (no result)
EOSINOPHIL # BLD AUTO: 0 K/UL — SIGNIFICANT CHANGE UP (ref 0–0.7)
EOSINOPHIL NFR BLD AUTO: 0 % — SIGNIFICANT CHANGE UP (ref 0–8)
GIANT PLATELETS BLD QL SMEAR: PRESENT — SIGNIFICANT CHANGE UP
GLUCOSE SERPL-MCNC: 52 MG/DL — LOW (ref 70–99)
GLUCOSE SERPL-MCNC: 68 MG/DL — LOW (ref 70–99)
GLUCOSE SERPL-MCNC: 71 MG/DL — SIGNIFICANT CHANGE UP (ref 70–99)
GLUCOSE UR QL: NEGATIVE MG/DL — SIGNIFICANT CHANGE UP
HCT VFR BLD CALC: 23.7 % — LOW (ref 42–52)
HCT VFR BLD CALC: 25.1 % — LOW (ref 42–52)
HGB BLD-MCNC: 7.9 G/DL — LOW (ref 14–18)
HGB BLD-MCNC: 8.3 G/DL — LOW (ref 14–18)
KETONES UR-MCNC: (no result)
LEUKOCYTE ESTERASE UR-ACNC: (no result)
LYMPHOCYTES # BLD AUTO: 0.53 K/UL — LOW (ref 1.2–3.4)
LYMPHOCYTES # BLD AUTO: 1.7 % — LOW (ref 20.5–51.1)
LYMPHOCYTES # SPEC AUTO: 0.9 % — HIGH (ref 0–0)
MAGNESIUM SERPL-MCNC: 1.6 MG/DL — LOW (ref 1.8–2.4)
MANUAL SMEAR VERIFICATION: SIGNIFICANT CHANGE UP
MCHC RBC-ENTMCNC: 29.8 PG — SIGNIFICANT CHANGE UP (ref 27–31)
MCHC RBC-ENTMCNC: 29.9 PG — SIGNIFICANT CHANGE UP (ref 27–31)
MCHC RBC-ENTMCNC: 33.1 G/DL — SIGNIFICANT CHANGE UP (ref 32–37)
MCHC RBC-ENTMCNC: 33.3 G/DL — SIGNIFICANT CHANGE UP (ref 32–37)
MCV RBC AUTO: 89.4 FL — SIGNIFICANT CHANGE UP (ref 80–94)
MCV RBC AUTO: 90.3 FL — SIGNIFICANT CHANGE UP (ref 80–94)
METAMYELOCYTES # FLD: 1.7 % — HIGH (ref 0–0)
MONOCYTES # BLD AUTO: 1.09 K/UL — HIGH (ref 0.1–0.6)
MONOCYTES NFR BLD AUTO: 3.5 % — SIGNIFICANT CHANGE UP (ref 1.7–9.3)
MYELOCYTES NFR BLD: 0.9 % — HIGH (ref 0–0)
NEUTROPHILS # BLD AUTO: 28.1 K/UL — HIGH (ref 1.4–6.5)
NEUTROPHILS NFR BLD AUTO: 75.6 % — HIGH (ref 42.2–75.2)
NEUTS BAND # BLD: 14.8 % — HIGH (ref 0–6)
NITRITE UR-MCNC: NEGATIVE — SIGNIFICANT CHANGE UP
NRBC # BLD: 0 /100 WBCS — SIGNIFICANT CHANGE UP (ref 0–0)
NRBC # BLD: 0 /100 WBCS — SIGNIFICANT CHANGE UP (ref 0–0)
PH UR: 6 — SIGNIFICANT CHANGE UP (ref 5–8)
PLAT MORPH BLD: NORMAL — SIGNIFICANT CHANGE UP
PLATELET # BLD AUTO: 115 K/UL — LOW (ref 130–400)
PLATELET # BLD AUTO: 115 K/UL — LOW (ref 130–400)
POIKILOCYTOSIS BLD QL AUTO: SIGNIFICANT CHANGE UP
POTASSIUM SERPL-MCNC: 3.8 MMOL/L — SIGNIFICANT CHANGE UP (ref 3.5–5)
POTASSIUM SERPL-MCNC: 4.1 MMOL/L — SIGNIFICANT CHANGE UP (ref 3.5–5)
POTASSIUM SERPL-MCNC: 4.2 MMOL/L — SIGNIFICANT CHANGE UP (ref 3.5–5)
POTASSIUM SERPL-SCNC: 3.8 MMOL/L — SIGNIFICANT CHANGE UP (ref 3.5–5)
POTASSIUM SERPL-SCNC: 4.1 MMOL/L — SIGNIFICANT CHANGE UP (ref 3.5–5)
POTASSIUM SERPL-SCNC: 4.2 MMOL/L — SIGNIFICANT CHANGE UP (ref 3.5–5)
PROMYELOCYTES # FLD: 0.9 % — HIGH (ref 0–0)
PROT UR-MCNC: >=300 MG/DL
RBC # BLD: 2.65 M/UL — LOW (ref 4.7–6.1)
RBC # BLD: 2.65 M/UL — LOW (ref 4.7–6.1)
RBC # BLD: 2.78 M/UL — LOW (ref 4.7–6.1)
RBC # FLD: 15.9 % — HIGH (ref 11.5–14.5)
RBC # FLD: 15.9 % — HIGH (ref 11.5–14.5)
RBC BLD AUTO: NORMAL — SIGNIFICANT CHANGE UP
RBC CASTS # UR COMP ASSIST: >50 /HPF
RETICS #: 41.9 K/UL — SIGNIFICANT CHANGE UP (ref 25–125)
RETICS/RBC NFR: 1.6 % — HIGH (ref 0.5–1.5)
SODIUM SERPL-SCNC: 138 MMOL/L — SIGNIFICANT CHANGE UP (ref 135–146)
SODIUM SERPL-SCNC: 140 MMOL/L — SIGNIFICANT CHANGE UP (ref 135–146)
SODIUM SERPL-SCNC: 144 MMOL/L — SIGNIFICANT CHANGE UP (ref 135–146)
SP GR SPEC: 1.02 — SIGNIFICANT CHANGE UP (ref 1.01–1.03)
TROPONIN T SERPL-MCNC: 0.11 NG/ML — CRITICAL HIGH
UROBILINOGEN FLD QL: 1 MG/DL (ref 0.2–0.2)
WBC # BLD: 28.78 K/UL — HIGH (ref 4.8–10.8)
WBC # BLD: 31.08 K/UL — HIGH (ref 4.8–10.8)
WBC # FLD AUTO: 28.78 K/UL — HIGH (ref 4.8–10.8)
WBC # FLD AUTO: 31.08 K/UL — HIGH (ref 4.8–10.8)
WBC UR QL: >50 /HPF

## 2018-07-13 RX ORDER — SODIUM CHLORIDE 9 MG/ML
1000 INJECTION INTRAMUSCULAR; INTRAVENOUS; SUBCUTANEOUS ONCE
Qty: 0 | Refills: 0 | Status: COMPLETED | OUTPATIENT
Start: 2018-07-13 | End: 2018-07-13

## 2018-07-13 RX ORDER — SODIUM CHLORIDE 9 MG/ML
500 INJECTION INTRAMUSCULAR; INTRAVENOUS; SUBCUTANEOUS ONCE
Qty: 0 | Refills: 0 | Status: COMPLETED | OUTPATIENT
Start: 2018-07-13 | End: 2018-07-13

## 2018-07-13 RX ORDER — VANCOMYCIN HCL 1 G
1000 VIAL (EA) INTRAVENOUS ONCE
Qty: 0 | Refills: 0 | Status: DISCONTINUED | OUTPATIENT
Start: 2018-07-13 | End: 2018-07-13

## 2018-07-13 RX ORDER — MEROPENEM 1 G/30ML
500 INJECTION INTRAVENOUS EVERY 24 HOURS
Qty: 0 | Refills: 0 | Status: DISCONTINUED | OUTPATIENT
Start: 2018-07-13 | End: 2018-07-13

## 2018-07-13 RX ORDER — SODIUM CHLORIDE 9 MG/ML
500 INJECTION INTRAMUSCULAR; INTRAVENOUS; SUBCUTANEOUS ONCE
Qty: 0 | Refills: 0 | Status: DISCONTINUED | OUTPATIENT
Start: 2018-07-13 | End: 2018-07-13

## 2018-07-13 RX ORDER — MORPHINE SULFATE 50 MG/1
5 CAPSULE, EXTENDED RELEASE ORAL
Qty: 0 | Refills: 0 | Status: DISCONTINUED | OUTPATIENT
Start: 2018-07-13 | End: 2018-07-16

## 2018-07-13 RX ADMIN — MEROPENEM 100 MILLIGRAM(S): 1 INJECTION INTRAVENOUS at 00:15

## 2018-07-13 RX ADMIN — SODIUM CHLORIDE 1000 MILLILITER(S): 9 INJECTION INTRAMUSCULAR; INTRAVENOUS; SUBCUTANEOUS at 12:03

## 2018-07-13 RX ADMIN — HEPARIN SODIUM 5000 UNIT(S): 5000 INJECTION INTRAVENOUS; SUBCUTANEOUS at 00:00

## 2018-07-13 RX ADMIN — SODIUM CHLORIDE 2000 MILLILITER(S): 9 INJECTION INTRAMUSCULAR; INTRAVENOUS; SUBCUTANEOUS at 01:00

## 2018-07-13 RX ADMIN — HEPARIN SODIUM 5000 UNIT(S): 5000 INJECTION INTRAVENOUS; SUBCUTANEOUS at 05:47

## 2018-07-13 NOTE — CONSULT NOTE ADULT - ASSESSMENT
87 y/o male with PMH as above being evaluated for goals of care. Patient is lethargic, responsive to stimuli.  At baseline, patient is bedbound s/p fall 7 months ago, with poor functional status.  Patient has a MOLST on file that indicates DNI/DNR/DNH. Palliative NP d/w Norberto Tobias ([phone]) patient's cousin who is the HCP to determine goals of care. Norberto said he and his sister are the only living relative alive and he is aware of patient's wishes for comfort measures only. He said patient had a fall 7 months ago, refused all medical intervention and said all he wanted was to die. Patient has a living will on chart which Kevin is aware of. Palliative Np discussed hospice  as the next level of care and which Norberto is agreeable with. Palliative team will continue to provide supportive care     Case d/w resident and Dr vasquez.        Plan:  No further blood draws, iv abx, invasive and aggressive interventions  Comfort Measure ONly  Hospice consult   DNR/DNI 89 y/o male with PMH as above being evaluated for goals of care. Patient is arousable responds to command and denies any distress/pain.  At baseline, patient is bedbound s/p fall 7 months ago, with poor functional status.  Patient has a MOLST on file that indicates DNI/DNR/DNH. Palliative NP d/w Norberto Tobias ([phone]) patient's cousin who is the HCP to determine goals of care. Norberto said he and his sister are the only living relative alive and he is aware of patient's wishes for comfort measures only. He said patient had a fall 7 months ago, refused all medical intervention and said all he wanted was to die. Patient has a living will on chart which Kevin is aware of. Palliative Np discussed hospice  as the next level of care and which Norberto is agreeable with. Palliative team will continue to provide supportive care     Case d/w resident and Dr vasquez.        Plan:  No further blood draws, iv abx, invasive and aggressive interventions  Comfort Measure ONly  Hospice consult   DNR/DNI 89 y/o male with PMH as above being evaluated for goals of care. Patient is arousable responds to command and denies any distress/pain.  At baseline, patient is bedbound s/p fall 7 months ago, with poor functional status.  Patient has a MOLST on file that indicates DNI/DNR/DNH. Palliative NP d/w Norberto Tobias ([phone]) patient's cousin who is the HCP to determine goals of care. Norberto said he and his sister are the only living relative alive and he is aware of patient's wishes for comfort measures only. He said patient had a fall 7 months ago, refused all medical intervention and said all he wanted was to die. Patient has a living will on chart which Kevin is aware of. Palliative Np discussed hospice  as the next level of care and which Norberto is agreeable with. Palliative team will continue to provide supportive care     Case d/w resident and Dr vasquez.        Plan:  No further blood draws, iv abx, invasive and aggressive interventions  Comfort Measure ONly  Roxanol 5mg po q3hrs pain/ resp distress  Hospice consult   DNR/DNI 89 y/o male with PMH as above being evaluated for goals of care. Patient is arousable responds to command and denies any distress/pain.  At baseline, patient is bedbound s/p fall 7 months ago, with poor functional status.  Patient has a MOLST on file that indicates DNI/DNR/DNH. Palliative team d/w Norberto Felipecristina ([phone]) patient's cousin who is the HCP to determine goals of care. Norberto said he and his sister are the only living relative alive and he is aware of patient's wishes for comfort measures only. He said patient had a fall 7 months ago, refused all medical intervention and said all he wanted was to die. Patient has a living will on chart which Kevin is aware of.  According to the nursing director tone Gaetanorebecca to whom I spoke after HCP said his sister was notified that the patient was being sent to the hospital, informed me that she had been involved and the patient was asked if he wanted to go to the hospital and said he did. Palliative Np discussed hospice  as the next level of care and which Norberto is agreeable with. Palliative team will continue to provide supportive care     Case d/w resident and Dr vasquez.        Plan:  No further blood draws, iv abx, invasive and aggressive interventions  Comfort Measure ONly  Roxanol 5mg po q3hrs pain/ resp distress  Hospice consult   DNR/DNI

## 2018-07-13 NOTE — CONSULT NOTE ADULT - SUBJECTIVE AND OBJECTIVE BOX
REQUESTED OF: DR Ocampo    CLINICAL ISSUE TO BE EVALUATED BY CONSULTANT: Goals of Care    This sis a 87 y/o male with PMH of CAD s/p stent, GERD, HTN, DLD, Dementia presents from [facility] with hypotension and fever. Patient had BP 81/54 and T 102.4 F. In ED, received 1 L LR and Vancomycin x 1 and Cefepime. He became hemodynamically stable. Currently, septic source is unknown. UA is sent but not yet resulted. CXR is negative. As per HCP (cousin, only living relative), patient is DNR/DNI and does not want any aggressive measures. HCP is currently in [place]. Palliative consulted for goals of care.       PAST MEDICAL & SURGICAL HISTORY:  Dermatitis  Vitamin deficiency  Scabies  Hypokalemia  UTI (urinary tract infection)  Sepsis  CAD (coronary artery disease)  Altered mental status, unspecified  Anemia  Depressed  HLD (hyperlipidemia)  FARZANA (acute kidney injury)  GERD (gastroesophageal reflux disease)  HTN (hypertension)        PHYSICAL EXAM:    General: Frail, cachectic looking male, lethargic NAD   Head: Temporal wasting   Cardiac: S1S2 RRR  Abdomen: soft NT/ND, + BS  ext: no edema  	    T(C): 36.2, Max: 39.2 (17:01)  HR: 81 (81 - 117)  BP: 94/52 (68/45 - 116/40)  RR: 18 (18 - 22)  SpO2: 98% (90% - 100%)      LABS/STUDIES:  07-13    138  |  100  |  107<HH>  ----------------------------<  68<L>  3.8   |  19  |  4.5<HH>    Ca    8.2<L>      13 Jul 2018 08:50  Mg     1.8     07-12    TPro  5.1<L>  /  Alb  2.3<L>  /  TBili  0.6  /  DBili  x   /  AST  25  /  ALT  16  /  AlkPhos  257<H>  07-12                            7.9    31.08 )-----------( 115      ( 13 Jul 2018 08:50 )             23.7       MEDICATIONS  (STANDING):  aspirin enteric coated 81 milliGRAM(s) Oral daily  atorvastatin 40 milliGRAM(s) Oral at bedtime  calcitriol   Capsule 0.5 MICROGram(s) Oral daily  docusate sodium 100 milliGRAM(s) Oral two times a day  folic acid 1 milliGRAM(s) Oral daily  heparin  Injectable 5000 Unit(s) SubCutaneous every 8 hours  meropenem  IVPB 500 milliGRAM(s) IV Intermittent every 24 hours  pantoprazole    Tablet 40 milliGRAM(s) Oral before breakfast  senna 2 Tablet(s) Oral at bedtime  sodium chloride 0.9% Bolus 500 milliLiter(s) IV Bolus once  sodium chloride 0.9% Bolus 500 milliLiter(s) IV Bolus once  sodium chloride 0.9%. 1000 milliLiter(s) (75 mL/Hr) IV Continuous <Continuous>  vancomycin  IVPB 1000 milliGRAM(s) IV Intermittent once    MEDICATIONS  (PRN):  acetaminophen   Tablet 650 milliGRAM(s) Oral every 6 hours PRN For Temp greater than 38 C (100.4 F)        [place] Symptom Assesment Scale      PPS (Palliative Performance Scale): 30% REQUESTED OF: DR Ocampo    CLINICAL ISSUE TO BE EVALUATED BY CONSULTANT: Goals of Care    This sis a 87 y/o male with PMH of CAD s/p stent, GERD, HTN, DLD, Dementia presents from [facility] with hypotension and fever. Patient had BP 81/54 and T 102.4 F. In ED, received 1 L LR and Vancomycin x 1 and Cefepime. He became hemodynamically stable. Currently, septic source is unknown. UA is sent but not yet resulted. CXR is negative. As per HCP (cousin, only living relative), patient is DNR/DNI and does not want any aggressive measures. HCP is currently in [place]. Palliative consulted for goals of care.       PAST MEDICAL & SURGICAL HISTORY:  Dermatitis  Vitamin deficiency  Scabies  Hypokalemia  UTI (urinary tract infection)  Sepsis  CAD (coronary artery disease)  Altered mental status, unspecified  Anemia  Depressed  HLD (hyperlipidemia)  FARZANA (acute kidney injury)  GERD (gastroesophageal reflux disease)  HTN (hypertension)        PHYSICAL EXAM:    General: Frail, cachectic looking male,  NAD  Head: Temporal wasting   Cardiac: S1S2 RRR  Abdomen: soft NT/ND, + BS  ext: no edema  	    T(C): 36.2, Max: 39.2 (17:01)  HR: 81 (81 - 117)  BP: 94/52 (68/45 - 116/40)  RR: 18 (18 - 22)  SpO2: 98% (90% - 100%)      LABS/STUDIES:  07-13    138  |  100  |  107<HH>  ----------------------------<  68<L>  3.8   |  19  |  4.5<HH>    Ca    8.2<L>      13 Jul 2018 08:50  Mg     1.8     07-12    TPro  5.1<L>  /  Alb  2.3<L>  /  TBili  0.6  /  DBili  x   /  AST  25  /  ALT  16  /  AlkPhos  257<H>  07-12                            7.9    31.08 )-----------( 115      ( 13 Jul 2018 08:50 )             23.7       MEDICATIONS  (STANDING):  aspirin enteric coated 81 milliGRAM(s) Oral daily  atorvastatin 40 milliGRAM(s) Oral at bedtime  calcitriol   Capsule 0.5 MICROGram(s) Oral daily  docusate sodium 100 milliGRAM(s) Oral two times a day  folic acid 1 milliGRAM(s) Oral daily  heparin  Injectable 5000 Unit(s) SubCutaneous every 8 hours  meropenem  IVPB 500 milliGRAM(s) IV Intermittent every 24 hours  pantoprazole    Tablet 40 milliGRAM(s) Oral before breakfast  senna 2 Tablet(s) Oral at bedtime  sodium chloride 0.9% Bolus 500 milliLiter(s) IV Bolus once  sodium chloride 0.9% Bolus 500 milliLiter(s) IV Bolus once  sodium chloride 0.9%. 1000 milliLiter(s) (75 mL/Hr) IV Continuous <Continuous>  vancomycin  IVPB 1000 milliGRAM(s) IV Intermittent once    MEDICATIONS  (PRN):  acetaminophen   Tablet 650 milliGRAM(s) Oral every 6 hours PRN For Temp greater than 38 C (100.4 F)        [place] Symptom Assesment Scale      PPS (Palliative Performance Scale): 30%

## 2018-07-13 NOTE — PROGRESS NOTE ADULT - SUBJECTIVE AND OBJECTIVE BOX
SUBJECTIVE:    Patient is a 88y old Male who presents with a chief complaint of Hypotensive to 80/50 with Fever (2018 20:45)    Currently admitted to medicine with the primary diagnosis of Sepsis     Today is hospital day 1d. This morning he wince with sternal rub.     PAST MEDICAL & SURGICAL HISTORY  Dermatitis  Vitamin deficiency  Scabies  Hypokalemia  UTI (urinary tract infection)  Sepsis  CAD (coronary artery disease)  Altered mental status, unspecified  Anemia  Depressed  HLD (hyperlipidemia)  FARZANA (acute kidney injury)  GERD (gastroesophageal reflux disease)  HTN (hypertension)    SOCIAL HISTORY:  Negative for smoking/alcohol/drug use.     ALLERGIES:  No Known Allergies    MEDICATIONS:  STANDING MEDICATIONS    PRN MEDICATIONS  morphine Concentrate 5 milliGRAM(s) Oral every 3 hours PRN    VITALS:   T(F): 96.5  HR: 81  BP: 109/52  RR: 20  SpO2: 100%    LABS:                        8.3    28.78 )-----------( 115      ( 2018 13:22 )             25.1         140  |  103  |  106<HH>  ----------------------------<  71  4.2   |  21  |  4.6<HH>    Ca    8.1<L>      2018 13:22  Mg     1.6         TPro  5.1<L>  /  Alb  2.3<L>  /  TBili  0.6  /  DBili  x   /  AST  25  /  ALT  16  /  AlkPhos  257<H>      PT/INR - ( 2018 16:53 )   PT: 12.30 sec;   INR: 1.14 ratio         PTT - ( 2018 16:53 )  PTT:29.6 sec  Urinalysis Basic - ( 2018 00:00 )    Color: Red / Appearance: Turbid / S.020 / pH: x  Gluc: x / Ketone: Trace  / Bili: Small / Urobili: 1.0 mg/dL   Blood: x / Protein: >=300 mg/dL / Nitrite: Negative   Leuk Esterase: Large / RBC: >50 /HPF / WBC >50 /HPF   Sq Epi: x / Non Sq Epi: x / Bacteria: Many /HPF        Creatine Kinase, Serum: 208 U/L (18 @ 02:17)  Troponin T, Serum: 0.11 ng/mL <HH> (18 @ 02:17)      CARDIAC MARKERS ( 2018 02:17 )  x     / 0.11 ng/mL / 208 U/L / x     / x      CARDIAC MARKERS ( 2018 16:54 )  x     / 0.14 ng/mL / 136 U/L / x     / 1.2 ng/mL      RADIOLOGY:    PHYSICAL EXAM:  GEN: No acute distress, cachectic  LUNGS: Clear to auscultation bilaterally   HEART: Regular  ABD: Soft, tender, non-distended.  EXT: NC/NC/NE/2+PP/CIFUENTES/Skin Intact.   NEURO: wince with sternal rub

## 2018-07-13 NOTE — PROGRESS NOTE ADULT - ASSESSMENT
87 y/o male with PMH as above being evaluated for goals of care. Patient is arousable responds to command and denies any distress/pain.  At baseline, patient is bedbound s/p fall 7 months ago, with poor functional status.  Patient has a MOLST on file that indicates DNI/DNR/DNH. Palliative team d/w Norberto Felipecristina ([phone]) patient's cousin who is the HCP to determine goals of care. Norberto said he and his sister are the only living relative alive and he is aware of patient's wishes for comfort measures only. He said patient had a fall 7 months ago, refused all medical intervention and said all he wanted was to die. Patient has a living will on chart which Kevin is aware of.  According to the nursing director tone Gaetanorebecca to whom I spoke after HCP said his sister was notified that the patient was being sent to the hospital, informed me that she had been involved and the patient was asked if he wanted to go to the hospital and said he did. Palliative Np discussed hospice  as the next level of care and which Norberto is agreeable with. Palliative team will continue to provide supportive care     Case d/w resident and Dr vasquez.        Plan:  No further blood draws, iv abx, ivf, invasive and aggressive interventions  Comfort Measure Only  Roxanol 5mg po q3hrs pain/ resp distress  Hospice consult   DNR/DNI

## 2018-07-14 LAB
-  CANDIDA ALBICANS: SIGNIFICANT CHANGE UP
-  CANDIDA GLABRATA: SIGNIFICANT CHANGE UP
-  CANDIDA KRUSEI: SIGNIFICANT CHANGE UP
-  CANDIDA PARAPSILOSIS: SIGNIFICANT CHANGE UP
-  CANDIDA TROPICALIS: SIGNIFICANT CHANGE UP
-  COAGULASE NEGATIVE STAPHYLOCOCCUS: SIGNIFICANT CHANGE UP
-  K. PNEUMONIAE GROUP: SIGNIFICANT CHANGE UP
-  KPC RESISTANCE GENE: SIGNIFICANT CHANGE UP
-  STREPTOCOCCUS SP. (NOT GRP A, B OR S PNEUMONIAE): SIGNIFICANT CHANGE UP
A BAUMANNII DNA SPEC QL NAA+PROBE: SIGNIFICANT CHANGE UP
CULTURE RESULTS: SIGNIFICANT CHANGE UP
E CLOAC COMP DNA BLD POS QL NAA+PROBE: SIGNIFICANT CHANGE UP
E COLI DNA BLD POS QL NAA+NON-PROBE: SIGNIFICANT CHANGE UP
ENTEROCOC DNA BLD POS QL NAA+NON-PROBE: SIGNIFICANT CHANGE UP
ENTEROCOC DNA BLD POS QL NAA+NON-PROBE: SIGNIFICANT CHANGE UP
GP B STREP DNA BLD POS QL NAA+NON-PROBE: SIGNIFICANT CHANGE UP
GRAM STN FLD: SIGNIFICANT CHANGE UP
HAEM INFLU DNA BLD POS QL NAA+NON-PROBE: SIGNIFICANT CHANGE UP
K OXYTOCA DNA BLD POS QL NAA+NON-PROBE: SIGNIFICANT CHANGE UP
L MONOCYTOG DNA BLD POS QL NAA+NON-PROBE: SIGNIFICANT CHANGE UP
METHOD TYPE: SIGNIFICANT CHANGE UP
MRSA SPEC QL CULT: SIGNIFICANT CHANGE UP
MSSA DNA SPEC QL NAA+PROBE: SIGNIFICANT CHANGE UP
N MEN ISLT CULT: SIGNIFICANT CHANGE UP
P AERUGINOSA DNA BLD POS NAA+NON-PROBE: SIGNIFICANT CHANGE UP
S MARCESCENS DNA BLD POS NAA+NON-PROBE: SIGNIFICANT CHANGE UP
S PNEUM DNA BLD POS QL NAA+NON-PROBE: SIGNIFICANT CHANGE UP
S PYO DNA BLD POS QL NAA+NON-PROBE: SIGNIFICANT CHANGE UP
SPECIMEN SOURCE: SIGNIFICANT CHANGE UP

## 2018-07-14 RX ADMIN — MORPHINE SULFATE 5 MILLIGRAM(S): 50 CAPSULE, EXTENDED RELEASE ORAL at 21:24

## 2018-07-14 RX ADMIN — MORPHINE SULFATE 5 MILLIGRAM(S): 50 CAPSULE, EXTENDED RELEASE ORAL at 21:18

## 2018-07-14 NOTE — PROGRESS NOTE ADULT - SUBJECTIVE AND OBJECTIVE BOX
SUBJECTIVE:    Patient is a 88y old Male who presents with a chief complaint of Hypotensive to 80/50 with Fever (2018 20:45)    Currently admitted to medicine with the primary diagnosis of Sepsis     Today is hospital day 2d. This morning he is tolerating liquid feed. Reports in no pain. A&Ox1. No overnight events.     PAST MEDICAL & SURGICAL HISTORY  Dermatitis  Vitamin deficiency  Scabies  Hypokalemia  UTI (urinary tract infection)  Sepsis  CAD (coronary artery disease)  Altered mental status, unspecified  Anemia  Depressed  HLD (hyperlipidemia)  FARZANA (acute kidney injury)  GERD (gastroesophageal reflux disease)  HTN (hypertension)    SOCIAL HISTORY:  Negative for smoking/alcohol/drug use.     ALLERGIES:  No Known Allergies    MEDICATIONS:  STANDING MEDICATIONS    PRN MEDICATIONS  morphine Concentrate 5 milliGRAM(s) Oral every 3 hours PRN    VITALS:   T(F): 96.5  HR: 92  BP: 114/56  RR: 19  SpO2: 99%    LABS:                        8.3    28.78 )-----------( 115      ( 2018 13:22 )             25.1         144  |  105  |  110<HH>  ----------------------------<  52<L>  4.1   |  20  |  4.4<HH>    Ca    8.2<L>      2018 18:41  Mg     1.6         TPro  5.1<L>  /  Alb  2.3<L>  /  TBili  0.6  /  DBili  x   /  AST  25  /  ALT  16  /  AlkPhos  257<H>  12    PT/INR - ( 2018 16:53 )   PT: 12.30 sec;   INR: 1.14 ratio         PTT - ( 2018 16:53 )  PTT:29.6 sec  Urinalysis Basic - ( 2018 00:00 )    Color: Red / Appearance: Turbid / S.020 / pH: x  Gluc: x / Ketone: Trace  / Bili: Small / Urobili: 1.0 mg/dL   Blood: x / Protein: >=300 mg/dL / Nitrite: Negative   Leuk Esterase: Large / RBC: >50 /HPF / WBC >50 /HPF   Sq Epi: x / Non Sq Epi: x / Bacteria: Many /HPF            Culture - Urine (collected 2018 00:00)  Source: .Urine Catheterized  Final Report (2018 13:46):    <10,000 CFU/ml Normal Urogenital irma present    Culture - Blood (collected 2018 16:29)  Source: .Blood Blood-Peripheral  Gram Stain (2018 12:11):    Growth in aerobic bottle: Gram Positive Cocci in Clusters  Preliminary Report (2018 12:11):    Growth in aerobic bottle: Gram Positive Cocci in Clusters    "Due to technical problems, Proteus sp. will Not be reported as part of    the BCID panel until further notice" ***Blood Panel PCR results on this    specimen are available    approximately 3 hours after the Gram stain result.***    Gram stain, PCR, and/or culture results may not always    correspond due to difference in methodologies.    ************************************************************    This PCR assay was performed using [employer].    The following targets are tested for: Enterococcus,    vancomycin resistant enterococci, Listeria monocytogenes,    coagulase negative staphylococci, S. aureus,    methicillin resistant S. aureus, Streptococcus agalactiae    (Group B), S. pneumoniae, S.pyogenes (Group A),    Acinetobacter baumannii, Enterobacter cloacae, E. coli,    Klebsiella oxytoca, K. pneumoniae, Proteus sp.,    Serratia marcescens, Haemophilus influenzae,    Neisseria meningitidis, Pseudomonas aeruginosa, Candida    albicans, C. glabrata, C krusei, C parapsilosis,    C. tropicalis and the KPC resistance gene.      CARDIAC MARKERS ( 2018 02:17 )  x     / 0.11 ng/mL / 208 U/L / x     / x      CARDIAC MARKERS ( 2018 16:54 )  x     / 0.14 ng/mL / 136 U/L / x     / 1.2 ng/mL      RADIOLOGY:    PHYSICAL EXAM:  GEN: No acute distress, cachectic  LUNGS: Clear to auscultation bilaterally   HEART: Regular  ABD: Soft, tender, non-distended.  EXT: NC/NC/NE/2+PP/CIFUENTES/Skin Intact.   NEURO: A&Ox1    Intravenous access:   NG tube:   Childress Catheter:   Indwelling Urethral Catheter:     Connect To:  Straight Drainage/Gravity    Indication:  Urine Output Monitoring in Critically Ill (18 @ 21:49) (not performed) [active]

## 2018-07-14 NOTE — PROGRESS NOTE ADULT - SUBJECTIVE AND OBJECTIVE BOX
KASHKATIA [id number]    Hospitalist Note  89 yo M with Past Medical History CAD status post stenting, GERD, HTN, DLD, Dementia, and depression transferred from [facility] for hypotension and fevers secondary to septic shock secondary to catheter associated urinary tract infection.    Overnight events/Updates: Palliative Care was consulted yesterday, and his son/healthcare proxy was contacted [place].  The patient had previous instructions to pursue comfort measures only.  Antibiotics were subsequently discontinued.  IVF were stopped.  He appears more alert and interactive this AM without any obvious discomfort.    Vital Signs Last 24 Hrs  T(C): 35.8 (14 Jul 2018 05:08), Max: 35.8 (13 Jul 2018 14:42)  T(F): 96.5 (14 Jul 2018 05:08), Max: 96.5 (13 Jul 2018 14:42)  HR: 92 (14 Jul 2018 05:08) (80 - 92)  BP: 114/56 (14 Jul 2018 05:08) (98/54 - 114/56)  BP(mean): --  RR: 19 (14 Jul 2018 05:08) (19 - 20)  SpO2: 99% (14 Jul 2018 04:21) (99% - 100%)    Physical Examination:  General: AAO x 1; more alert and interactive  HEENT: PERRLA, EOMI  CV= S1 & S2 appreciated  Lungs=CTA BL  Abdominal Examination= + BS, Soft, NT/ND, + Childress  Extremity Examination= 3+ peripheral edema without clubbing/cyanosis    ROS: No chest pain, no shortness of breath.  All other systems reviewed and are within normal limits except for the complaints in the HPI.    MEDICATIONS  (STANDING):    MEDICATIONS  (PRN):  morphine Concentrate 5 milliGRAM(s) Oral every 3 hours PRN Respiratory distress                            8.3    28.78 )-----------( 115      ( 13 Jul 2018 13:22 )             25.1     07-13    144  |  105  |  110<HH>  ----------------------------<  52<L>  4.1   |  20  |  4.4<HH>    Ca    8.2<L>      13 Jul 2018 18:41  Mg     1.6     07-13    TPro  5.1<L>  /  Alb  2.3<L>  /  TBili  0.6  /  DBili  x   /  AST  25  /  ALT  16  /  AlkPhos  257<H>  07-12      Case discussed with housestaff & family  LEXX Costa [phone]

## 2018-07-14 NOTE — SWALLOW BEDSIDE ASSESSMENT ADULT - COMMENTS
Mild oral dysphagia without overt symptoms penetration/aspiration Tolerated without overt symptoms penetration/aspiration Suspected pharyngeal dysphagia for large uncontrolled cup sips, straw sips. Other consistencies not trialed secondary to generalized weakness

## 2018-07-14 NOTE — PROGRESS NOTE ADULT - ASSESSMENT
89 yo M with Past Medical History CAD status post stenting, GERD, HTN, DLD, Dementia, and depression transferred from [facility] for hypotension and fevers secondary to septic shock secondary to catheter associated urinary tract infection.    Septic shock secondary to catheter associated urinary tract infection: UA was grossly positive with large leukocytes.  No aggressive measures as per Nursing Home documentation.  Antibiotics and intravenous fluids were discontinued.  No further labwork was ordered.  Continue Roxanol for dyspnea and Ativan for agitation.  Would reevaluate for return to SNF if the patient remains stable through the remainder of this weekend.  Acute on chronic renal failure: likely secondary to sepsis/ongoing infection.  Status post IVF infusion.  No further radiologic imaging warranted at this time (dc renal/bladder US).  Hypertension: off Lopressor due to hypotension  Hypercholesteremia: statins were discontinued  GI/DVT prophylaxis  DNR/DNI

## 2018-07-14 NOTE — PROGRESS NOTE ADULT - ASSESSMENT
89 y/o male with PMH as above being evaluated for goals of care. Patient is arousable responds to command and denies any distress/pain.  At baseline, patient is bedbound s/p fall 7 months ago, with poor functional status.  Patient has a MOLST on file that indicates DNI/DNR/DNH. Palliative team d/w Norberto Felipecristina ([phone]) patient's cousin who is the HCP to determine goals of care. Norberto said he and his sister are the only living relative alive and he is aware of patient's wishes for comfort measures only. He said patient had a fall 7 months ago, refused all medical intervention and said all he wanted was to die. Patient has a living will on chart which Kevin is aware of.  According to the nursing director tone Gaetanorebecca to whom I spoke after HCP said his sister was notified that the patient was being sent to the hospital, informed me that she had been involved and the patient was asked if he wanted to go to the hospital and said he did. Palliative Np discussed hospice  as the next level of care and which Norberto is agreeable with. Palliative team will continue to provide supportive care     Case d/w resident and Dr vasquez.        Plan:  No further blood draws, iv abx, ivf, invasive and aggressive interventions  Comfort Measure Only  Roxanol 5mg po q3hrs pain/ resp distress  Hospice consult   DNR/DNI 87 y/o male with PMH as above being evaluated for goals of care. Patient is arousable responds to command and denies any distress/pain.  At baseline, patient is bedbound s/p fall 7 months ago, with poor functional status.  Patient has a MOLST on file that indicates DNI/DNR/DNH. Palliative team d/w Norberto Felipecristina ([phone]) patient's cousin who is the HCP to determine goals of care. Norberto said he and his sister are the only living relative alive and he is aware of patient's wishes for comfort measures only. He said patient had a fall 7 months ago, refused all medical intervention and said all he wanted was to die. Patient has a living will on chart which Kevin is aware of.  According to the nursing director tone Gaetanorebecca to whom I spoke after HCP said his sister was notified that the patient was being sent to the hospital, informed me that she had been involved and the patient was asked if he wanted to go to the hospital and said he did. Palliative Np discussed hospice  as the next level of care and which Norberto is agreeable with. Palliative team will continue to provide supportive care     Case d/w resident and Dr vasquez.        Plan:  No further blood draws, iv abx, ivf, invasive and aggressive interventions  Comfort Measure Only  Roxanol 5mg po q3hrs pain/ resp distress  Hospice consult   Diet- seen by speech and swallow. mech soft ground, thin liq  DNR/DNI

## 2018-07-14 NOTE — SWALLOW BEDSIDE ASSESSMENT ADULT - MODE OF PRESENTATION
spoon/fed by clinician cup/fed by clinician/controlled small sips straw/self fed/large, uncontrolled cup sips

## 2018-07-15 LAB
CULTURE RESULTS: SIGNIFICANT CHANGE UP
ORGANISM # SPEC MICROSCOPIC CNT: SIGNIFICANT CHANGE UP
ORGANISM # SPEC MICROSCOPIC CNT: SIGNIFICANT CHANGE UP
SPECIMEN SOURCE: SIGNIFICANT CHANGE UP

## 2018-07-15 NOTE — PROGRESS NOTE ADULT - ASSESSMENT
Septic shock secondary to catheter associated urinary tract infection:  Continue Roxanol for dyspnea and Ativan for agitation. D/C planning for hospice @SNF  Acute on chronic renal failure: NO MORE BLOOD DRAWS  Hypertension: controlled  Hypercholesteremia: statins not indicated  DNR/DNI

## 2018-07-15 NOTE — PROGRESS NOTE ADULT - SUBJECTIVE AND OBJECTIVE BOX
Pt seen and examined. Pt calm    T(F): , Max: 96.7 (07-15-18 @ 12:48)  HR: 86 (07-15-18 @ 12:48) (84 - 99)  BP: 112/59 (07-15-18 @ 12:48)  RR: 20 (07-15-18 @ 12:48)  SpO2: --  IN: 0 mL / OUT: 400 mL / NET: -400 mL    IN: 0 mL / OUT: 500 mL / NET: -500 mL      General: No apparent distress  Cardiovascular: S1, S2  Gastrointestinal: Soft, Non-tender, Non-distended  Respiratory: Good air entry bilaterally  Lymphatic: No edema  Dermatologic: Skin dry      07-13    144  |  105  |  110<HH>  ----------------------------<  52<L>  4.1   |  20  |  4.4<HH>    Ca    8.2<L>      13 Jul 2018 18:41        Culture - Blood (collected 13 Jul 2018 08:50)  Source: .Blood None  Preliminary Report (14 Jul 2018 18:01):    No growth to date.    Culture - Urine (collected 13 Jul 2018 00:00)  Source: .Urine Catheterized  Final Report (14 Jul 2018 13:46):    <10,000 CFU/ml Normal Urogenital irma present    Culture - Blood (collected 12 Jul 2018 16:29)  Source: .Blood Blood-Peripheral  Gram Stain (14 Jul 2018 12:11):    Growth in aerobic bottle: Gram Positive Cocci in Clusters  Final Report (15 Jul 2018 10:59):    Growth in aerobic bottle: Coag Negative Staphylococcus    Single set isolate, possible contaminant. Contact    Microbiology if susceptibility testing clinically    indicated.    "Due to technical problems, Proteus sp. will Not be reported as part of    the BCID panel until further notice" ***Blood Panel PCR results on this    specimen are available    approximately 3 hours after the Gram stain result.***    Gram stain, PCR, and/or culture results may not always    correspond due to difference in methodologies.    ************************************************************    This PCR assay was performed using [employer].    The following targets are tested for: Enterococcus,    vancomycin resistant enterococci, Listeria monocytogenes,    coagulase negative staphylococci, S. aureus,    methicillin resistant S. aureus, Streptococcus agalactiae    (Group B), S. pneumoniae, S. pyogenes (Group A),    Acinetobacter baumannii, Enterobacter cloacae, E. coli,    Klebsiella oxytoca, K. pneumoniae, Proteus sp.,    Serratia marcescens, Haemophilus influenzae,    Neisseria meningitidis, Pseudomonas aeruginosa, Candida    albicans, C. glabrata, C krusei, C parapsilosis,    C. tropicalis and the KPC resistance gene.  Organism: Blood Culture PCR (15 Jul 2018 10:59)  Organism: Blood Culture PCR (15 Jul 2018 10:59)

## 2018-07-16 ENCOUNTER — TRANSCRIPTION ENCOUNTER (OUTPATIENT)
Age: 83
End: 2018-07-16

## 2018-07-16 VITALS
DIASTOLIC BLOOD PRESSURE: 63 MMHG | SYSTOLIC BLOOD PRESSURE: 140 MMHG | TEMPERATURE: 96 F | RESPIRATION RATE: 18 BRPM | HEART RATE: 82 BPM

## 2018-07-16 RX ORDER — METOPROLOL TARTRATE 50 MG
1 TABLET ORAL
Qty: 0 | Refills: 0 | COMMUNITY

## 2018-07-16 RX ORDER — ACETAMINOPHEN 500 MG
2 TABLET ORAL
Qty: 0 | Refills: 0 | COMMUNITY

## 2018-07-16 RX ORDER — MORPHINE SULFATE 50 MG/1
0.25 CAPSULE, EXTENDED RELEASE ORAL
Qty: 0 | Refills: 0 | COMMUNITY
Start: 2018-07-16

## 2018-07-16 NOTE — DISCHARGE NOTE ADULT - PATIENT PORTAL LINK FT
You can access the [facility] Portal, offered by [facility] [place], by registering with the following website: [email or web address]

## 2018-07-16 NOTE — PROGRESS NOTE ADULT - SUBJECTIVE AND OBJECTIVE BOX
SUBJECTIVE:    Patient is a 88y old  Male who presents with a chief complaint of Hypotensive to 80/50 with Fever (12 Jul 2018 20:45)    Currently admitted to medicine with the primary diagnosis of Sepsis/ Today is hospital day 4d. This morning he is resting comfortably in bed and reports no new issues or overnight events.     PAST MEDICAL & SURGICAL HISTORY  PAST MEDICAL & SURGICAL HISTORY:  Dermatitis  Vitamin deficiency  Scabies  Hypokalemia  UTI (urinary tract infection)  Sepsis  CAD (coronary artery disease)  Altered mental status, unspecified  Anemia  Depressed  HLD (hyperlipidemia)  FARZANA (acute kidney injury)  GERD (gastroesophageal reflux disease)  HTN (hypertension)    SOCIAL HISTORY:    ALLERGIES:  No Known Allergies    MEDICATIONS:  STANDING MEDICATIONS    PRN MEDICATIONS  morphine Concentrate 5 milliGRAM(s) Oral every 3 hours PRN    VITALS:   T(F): 96.2  HR: 80  BP: 125/60  RR: 18  SpO2: --    LABS:                        RADIOLOGY:    PHYSICAL EXAM:  GEN: No acute distress, cachectic  LUNGS: Clear to auscultation bilaterally   HEART: Regular  ABD: Soft, tender, non-distended.  EXT: NC/NC/NE/2+PP/CIFUENTES/Skin Intact.   NEURO: A&Ox1

## 2018-07-16 NOTE — PROGRESS NOTE ADULT - SUBJECTIVE AND OBJECTIVE BOX
KASH KATIA  88y Male    INTERVAL HPI/OVERNIGHT EVENTS:    No complaints - comfortable in bed.     T(F): 96.4 (07-16-18 @ 13:00), Max: 96.4 (07-16-18 @ 13:00)  HR: 82 (07-16-18 @ 13:00) (75 - 82)  BP: 140/63 (07-16-18 @ 13:00) (125/60 - 140/63)  RR: 18 (07-16-18 @ 13:00) (18 - 18)  SpO2: 97% (07-16-18 @ 10:13) (97% - 97%)  I&O's Summary    15 Jul 2018 07:01  -  16 Jul 2018 07:00  --------------------------------------------------------  IN: 0 mL / OUT: 1100 mL / NET: -1100 mL    16 Jul 2018 07:01  -  16 Jul 2018 13:36  --------------------------------------------------------  IN: 0 mL / OUT: 700 mL / NET: -700 mL      PHYSICAL EXAM:  GENERAL: NAD  HEAD:  Normocephalic  EYES:  conjunctiva and sclera clear  ENMT: Moist mucous membranes  NECK: Supple  NERVOUS SYSTEM:  sleepy, arousable  CHEST/LUNG: Clear to percussion bilaterally  HEART: Regular rate and rhythm  ABDOMEN: Soft, Nontender, Nondistended  EXTREMITIES:   LE edema      Consultant(s) Notes Reviewed:  [x ] YES  [ ] NO  Care Discussed with Consultants/Other Providers [ x] YES  [ ] NO    MEDICATIONS  (STANDING):    MEDICATIONS  (PRN):  morphine Concentrate 5 milliGRAM(s) Oral every 3 hours PRN Respiratory distress      Case discussed with resident

## 2018-07-16 NOTE — PROGRESS NOTE ADULT - SUBJECTIVE AND OBJECTIVE BOX
Patient  is more alert but confused.  He is pleasant and coopeative. His hcp called today. Will speak with him regarding plan, most likely to return to snf and resquest hoslpice consult there.

## 2018-07-16 NOTE — DISCHARGE NOTE ADULT - MEDICATION SUMMARY - MEDICATIONS TO STOP TAKING
I will STOP taking the medications listed below when I get home from the hospital:    Lopressor 50 mg oral tablet  -- 1 tab(s) by mouth 2 times a day

## 2018-07-16 NOTE — DISCHARGE NOTE ADULT - MEDICATION SUMMARY - MEDICATIONS TO CHANGE
I will SWITCH the dose or number of times a day I take the medications listed below when I get home from the hospital:    Mapap 325 mg oral tablet  -- 2 tab(s) by mouth every 4 hours

## 2018-07-16 NOTE — DISCHARGE NOTE ADULT - PLAN OF CARE
Comfort care measures moving forward. Pt is DNR/DNI and on comfort care.   For discharge back to hospice at SNF.  On morphine PO prn pain/distress.

## 2018-07-16 NOTE — DISCHARGE NOTE ADULT - CARE PLAN
Principal Discharge DX:	UTI (urinary tract infection)  Secondary Diagnosis:	Hospice care patient Principal Discharge DX:	UTI (urinary tract infection)  Goal:	Comfort care measures moving forward.  Assessment and plan of treatment:	Pt is DNR/DNI and on comfort care.   For discharge back to hospice at SNF.  On morphine PO prn pain/distress.  Secondary Diagnosis:	Hospice care patient

## 2018-07-16 NOTE — SWALLOW BEDSIDE ASSESSMENT ADULT - SWALLOW EVAL: RECOMMENDED DIET
Dysphagia 2 diet: Mechanical soft with ground meat with thin liquids via small sips
Mechanical soft ground with thin liquids
NPO with alternate means of nutrition and hydration. IF pt comfort care/palliative, consider comfort feeds when appropriate

## 2018-07-16 NOTE — DISCHARGE NOTE ADULT - MEDICATION SUMMARY - MEDICATIONS TO TAKE
I will START or STAY ON the medications listed below when I get home from the hospital:    aspirin 81 mg oral tablet  -- 1 tab(s) by mouth once a day  -- Indication: For CAD (coronary artery disease)    morphine 20 mg/mL oral concentrate  -- 0.25 milliliter(s) by mouth every 3 hours, As needed, Respiratory distress  -- Indication: For Palliative pain    Trintellix 5 mg oral tablet  -- 1 tab(s) by mouth once a day  -- Indication: For mood disorder    atorvastatin 40 mg oral tablet  -- 1 tab(s) by mouth once a day  -- Indication: For Dyslipidemia    Colace  -- Indication: For Constipation    Senna 8.6 mg oral tablet  -- 1 tab(s) by mouth once a day (at bedtime)  -- Indication: For Constipation    Acidophilus oral tablet  -- 2 tab(s) by mouth once a day  -- Indication: For Probiotic    calcitriol 0.5 mcg oral capsule  -- 1 cap(s) by mouth once a day  -- Indication: For Vitamin deficiency    folic acid 1 mg oral tablet  -- 1 tab(s) by mouth once a day  -- Indication: For Vitamin deficiency

## 2018-07-16 NOTE — PROGRESS NOTE ADULT - ASSESSMENT
87 yo M with Past Medical History CAD status post stenting, GERD, HTN, DLD, Dementia, and depression transferred from [facility] for hypotension and fevers secondary to septic shock secondary to catheter associated urinary tract infection.  Pt is DNR/DNI and on comfort care.   For discharge back to hospice at SNF.  On morphine PO prn pain/distress.

## 2018-07-16 NOTE — PROGRESS NOTE ADULT - ASSESSMENT
89 y/o male with PMH as above being evaluated for goals of care. Patient is arousable responds to command and denies any distress/pain.  At baseline, patient is bedbound s/p fall 7 months ago, with poor functional status.  Patient has a MOLST on file that indicates DNI/DNR/DNH. Palliative team d/w Norberto Alvarezbrenoris ([phone]) patient's cousin who is the HCP to determine goals of care. Norberto said he and his sister are the only living relative alive and he is aware of patient's wishes for comfort measures only. He said patient had a fall 7 months ago, refused all medical intervention and said all he wanted was to die. Patient has a living will on chart which Kevin is aware of.  According to the nursing director tone Lopez to whom I spoke after HCP said his sister was notified that the patient was being sent to the hospital, informed me that she had been involved and the patient was asked if he wanted to go to the hospital and said he did. Palliative Np discussed hospice  as the next level of care and which Norberto is agreeable with. Palliative team will continue to provide supportive care     Plan:  No further blood draws, iv abx, ivf, invasive and aggressive interventions  Comfort Measure Only  Roxanol 5mg po q3hrs pain/ resp distress  Hospice consult   Diet- seen by speech and swallow. mech soft ground, thin liq  DNR/DNI  Patient for possible discharge today. Health care proxy agrees hospice is good choice for him.

## 2018-07-16 NOTE — SWALLOW BEDSIDE ASSESSMENT ADULT - SWALLOW EVAL: DIAGNOSIS
Tolerated puree consistency and thin liquids via small sips without overt symptoms penetration/aspiration. Mild oral dysphagia for mechanical soft consistency without overt symptoms penetration/aspiration. Suspected pharyngeal dysphagia for straw sips of thin liquids. Pt. with increased impulsivity when eating/drinking.
No s/s aspiration/penetration for thin liquids and mechanical soft
PO trials not appropriate at this time 2' pt minimally arousable

## 2018-07-16 NOTE — DISCHARGE NOTE ADULT - HOSPITAL COURSE
89 yo M with PMHx of CAD s/p stent, GERD, HTN, DLD, Dementia presents from [facility] with hypotension and fever. Patient had BP 81/54 and T 102.4 F. In ED, received 1 L LR and Vancomycin x 1 and Cefepime. He became hemodynamically stable. Currently, septic source is unknown. UA is sent but not yet resulted. CXR is negative. As per HCP (cousin, only living relative), patient is DNR/DNI and does not want any aggressive measures. HCP is currently in [place].     HCP only agreed to changing the Childress catheter and adjustments to the antibiotic regimen. He agreed also to continued blood work to determine if there are other sources of infection. He does not want any other aggressive measure.     Pt is DNR/DNI and on comfort care.   For discharge back to hospice at SNF.  On morphine PO prn pain/distress.

## 2018-07-16 NOTE — SWALLOW BEDSIDE ASSESSMENT ADULT - SLP GENERAL OBSERVATIONS
Pt awake, +confusion noted. No c/o pain
Pt asleep, minimally arousable despite max cues. +lethargy
Pt. awake, confused, no apparent pain.

## 2018-07-16 NOTE — SWALLOW BEDSIDE ASSESSMENT ADULT - SWALLOW EVAL: CURRENT DIET
NPO pending Swallow re-assessment
Mechanical soft ground with thin liquids
NPO with alternate means of nutrition and hydration

## 2018-07-16 NOTE — SWALLOW BEDSIDE ASSESSMENT ADULT - SLP PERTINENT HISTORY OF CURRENT PROBLEM
Pt admitted with sepsis, rectal bleeding. PMHx: dementia. Pt current comfort care only, pending hospice c/s
Pt admitted with sepsis, rectal bleeding. PMHx: dementia. Pt current comfort care only
Pt admitted with sepsis, rectal bleeding. PMHx: dementia. Pt current comfort care only, pending hospice c/s

## 2018-07-18 LAB
CULTURE RESULTS: SIGNIFICANT CHANGE UP
SPECIMEN SOURCE: SIGNIFICANT CHANGE UP

## 2018-07-19 ENCOUNTER — OUTPATIENT (OUTPATIENT)
Dept: OUTPATIENT SERVICES | Facility: HOSPITAL | Age: 83
LOS: 1 days | End: 2018-07-19

## 2018-07-19 DIAGNOSIS — I25.10 ATHEROSCLEROTIC HEART DISEASE OF NATIVE CORONARY ARTERY WITHOUT ANGINA PECTORIS: ICD-10-CM

## 2018-07-19 DIAGNOSIS — K21.9 GASTRO-ESOPHAGEAL REFLUX DISEASE WITHOUT ESOPHAGITIS: ICD-10-CM

## 2018-07-19 DIAGNOSIS — N17.9 ACUTE KIDNEY FAILURE, UNSPECIFIED: ICD-10-CM

## 2018-07-19 DIAGNOSIS — D64.9 ANEMIA, UNSPECIFIED: ICD-10-CM

## 2018-07-19 DIAGNOSIS — I10 ESSENTIAL (PRIMARY) HYPERTENSION: ICD-10-CM

## 2018-07-19 DIAGNOSIS — Z95.5 PRESENCE OF CORONARY ANGIOPLASTY IMPLANT AND GRAFT: ICD-10-CM

## 2018-07-19 DIAGNOSIS — F32.9 MAJOR DEPRESSIVE DISORDER, SINGLE EPISODE, UNSPECIFIED: ICD-10-CM

## 2018-07-19 DIAGNOSIS — E78.00 PURE HYPERCHOLESTEROLEMIA, UNSPECIFIED: ICD-10-CM

## 2018-07-20 DIAGNOSIS — I51.9 HEART DISEASE, UNSPECIFIED: ICD-10-CM

## 2018-07-20 PROBLEM — I25.10 ATHEROSCLEROTIC HEART DISEASE OF NATIVE CORONARY ARTERY WITHOUT ANGINA PECTORIS: Chronic | Status: ACTIVE | Noted: 2018-07-12

## 2018-07-20 PROBLEM — N17.9 ACUTE KIDNEY FAILURE, UNSPECIFIED: Chronic | Status: ACTIVE | Noted: 2018-07-12

## 2018-07-20 PROBLEM — L30.9 DERMATITIS, UNSPECIFIED: Chronic | Status: ACTIVE | Noted: 2018-07-12

## 2018-07-20 PROBLEM — E78.5 HYPERLIPIDEMIA, UNSPECIFIED: Chronic | Status: ACTIVE | Noted: 2018-07-12

## 2018-07-20 PROBLEM — I10 ESSENTIAL (PRIMARY) HYPERTENSION: Chronic | Status: ACTIVE | Noted: 2018-07-12

## 2018-07-20 PROBLEM — F32.9 MAJOR DEPRESSIVE DISORDER, SINGLE EPISODE, UNSPECIFIED: Chronic | Status: ACTIVE | Noted: 2018-07-12

## 2018-07-20 PROBLEM — E56.9 VITAMIN DEFICIENCY, UNSPECIFIED: Chronic | Status: ACTIVE | Noted: 2018-07-12

## 2018-07-20 PROBLEM — N39.0 URINARY TRACT INFECTION, SITE NOT SPECIFIED: Chronic | Status: ACTIVE | Noted: 2018-07-12

## 2018-07-20 PROBLEM — A41.9 SEPSIS, UNSPECIFIED ORGANISM: Chronic | Status: ACTIVE | Noted: 2018-07-12

## 2018-07-20 PROBLEM — K21.9 GASTRO-ESOPHAGEAL REFLUX DISEASE WITHOUT ESOPHAGITIS: Chronic | Status: ACTIVE | Noted: 2018-07-12

## 2018-07-20 PROBLEM — B86 SCABIES: Chronic | Status: ACTIVE | Noted: 2018-07-12

## 2018-07-20 PROBLEM — E87.6 HYPOKALEMIA: Chronic | Status: ACTIVE | Noted: 2018-07-12

## 2018-07-20 PROBLEM — R41.82 ALTERED MENTAL STATUS, UNSPECIFIED: Chronic | Status: ACTIVE | Noted: 2018-07-12

## 2018-07-20 PROBLEM — D64.9 ANEMIA, UNSPECIFIED: Chronic | Status: ACTIVE | Noted: 2018-07-12

## 2018-07-22 DIAGNOSIS — T83.511A INFECTION AND INFLAMMATORY REACTION DUE TO INDWELLING URETHRAL CATHETER, INITIAL ENCOUNTER: ICD-10-CM

## 2018-07-22 DIAGNOSIS — I25.10 ATHEROSCLEROTIC HEART DISEASE OF NATIVE CORONARY ARTERY WITHOUT ANGINA PECTORIS: ICD-10-CM

## 2018-07-22 DIAGNOSIS — Z71.89 OTHER SPECIFIED COUNSELING: ICD-10-CM

## 2018-07-22 DIAGNOSIS — N17.9 ACUTE KIDNEY FAILURE, UNSPECIFIED: ICD-10-CM

## 2018-07-22 DIAGNOSIS — E55.9 VITAMIN D DEFICIENCY, UNSPECIFIED: ICD-10-CM

## 2018-07-22 DIAGNOSIS — R50.9 FEVER, UNSPECIFIED: ICD-10-CM

## 2018-07-22 DIAGNOSIS — K59.00 CONSTIPATION, UNSPECIFIED: ICD-10-CM

## 2018-07-22 DIAGNOSIS — I12.9 HYPERTENSIVE CHRONIC KIDNEY DISEASE WITH STAGE 1 THROUGH STAGE 4 CHRONIC KIDNEY DISEASE, OR UNSPECIFIED CHRONIC KIDNEY DISEASE: ICD-10-CM

## 2018-07-22 DIAGNOSIS — E78.5 HYPERLIPIDEMIA, UNSPECIFIED: ICD-10-CM

## 2018-07-22 DIAGNOSIS — A41.9 SEPSIS, UNSPECIFIED ORGANISM: ICD-10-CM

## 2018-07-22 DIAGNOSIS — R65.21 SEVERE SEPSIS WITH SEPTIC SHOCK: ICD-10-CM

## 2018-07-22 DIAGNOSIS — Z51.5 ENCOUNTER FOR PALLIATIVE CARE: ICD-10-CM
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
## 2018-07-22 DIAGNOSIS — F03.90 UNSPECIFIED DEMENTIA WITHOUT BEHAVIORAL DISTURBANCE: ICD-10-CM

## 2018-07-22 DIAGNOSIS — Z66 DO NOT RESUSCITATE: ICD-10-CM

## 2018-07-22 DIAGNOSIS — N39.0 URINARY TRACT INFECTION, SITE NOT SPECIFIED: ICD-10-CM

## 2018-07-22 DIAGNOSIS — N18.4 CHRONIC KIDNEY DISEASE, STAGE 4 (SEVERE): ICD-10-CM

## 2018-07-22 DIAGNOSIS — Z74.01 BED CONFINEMENT STATUS: ICD-10-CM

## 2018-07-22 DIAGNOSIS — Z95.5 PRESENCE OF CORONARY ANGIOPLASTY IMPLANT AND GRAFT: ICD-10-CM

## 2018-07-22 DIAGNOSIS — K21.9 GASTRO-ESOPHAGEAL REFLUX DISEASE WITHOUT ESOPHAGITIS: ICD-10-CM

## 2019-10-21 NOTE — PATIENT PROFILE ADULT. - FALLEN IN THE PAST
"See imported Sleep Study result in "Chart Review" under the "Media tab".  (This Sleep Study was interpreted by a Board Certified Sleep Specialist who conducted an epoch-by-epoch review of the entire raw data recording.) (The indication for this sleep study was reviewed and deemed appropriate by [employer] or other reasons by a Board Certified Sleep Specialist.) " unable to assess